# Patient Record
Sex: FEMALE | Race: WHITE | NOT HISPANIC OR LATINO | URBAN - METROPOLITAN AREA
[De-identification: names, ages, dates, MRNs, and addresses within clinical notes are randomized per-mention and may not be internally consistent; named-entity substitution may affect disease eponyms.]

---

## 2022-03-01 ENCOUNTER — TELEPHONE (OUTPATIENT)
Dept: NEUROLOGY | Facility: CLINIC | Age: 52
End: 2022-03-01

## 2022-03-01 NOTE — TELEPHONE ENCOUNTER
Reminder appt call    Patient is scheduled on 3/14/2022 @ 11 am with an arrival time  945 am in the [facility] location with Dr Dasilva  Patient states she will be getting partial of her records on 03/09/2022 she will be seeing Dr Boogie Tomas on 03/09/2022 for her baclofen pump and also 03/11/2022 Tysabri and will bring at appt old MRI CD

## 2022-03-14 ENCOUNTER — TELEPHONE (OUTPATIENT)
Dept: NEUROLOGY | Facility: CLINIC | Age: 52
End: 2022-03-14

## 2022-03-14 ENCOUNTER — OFFICE VISIT (OUTPATIENT)
Dept: NEUROLOGY | Facility: CLINIC | Age: 52
End: 2022-03-14
Payer: MEDICARE

## 2022-03-14 VITALS
TEMPERATURE: 96.4 F | HEART RATE: 61 BPM | SYSTOLIC BLOOD PRESSURE: 158 MMHG | BODY MASS INDEX: 25.9 KG/M2 | HEIGHT: 67 IN | DIASTOLIC BLOOD PRESSURE: 91 MMHG | WEIGHT: 165 LBS

## 2022-03-14 DIAGNOSIS — G35 MS (MULTIPLE SCLEROSIS) (HCC): Primary | ICD-10-CM

## 2022-03-14 DIAGNOSIS — Z97.8 PRESENCE OF INTRATHECAL BACLOFEN PUMP: ICD-10-CM

## 2022-03-14 DIAGNOSIS — N39.0 FREQUENT UTI: ICD-10-CM

## 2022-03-14 DIAGNOSIS — D80.1 HYPOGAMMAGLOBULINEMIA (HCC): ICD-10-CM

## 2022-03-14 DIAGNOSIS — R26.2 AMBULATORY DYSFUNCTION: ICD-10-CM

## 2022-03-14 DIAGNOSIS — G82.50 QUADRIPLEGIA AND QUADRIPARESIS (HCC): ICD-10-CM

## 2022-03-14 DIAGNOSIS — Z97.8: ICD-10-CM

## 2022-03-14 DIAGNOSIS — N31.9 NEUROGENIC BLADDER: ICD-10-CM

## 2022-03-14 DIAGNOSIS — G40.909 SEIZURE DISORDER (HCC): ICD-10-CM

## 2022-03-14 PROCEDURE — 99205 OFFICE O/P NEW HI 60 MIN: CPT | Performed by: PSYCHIATRY & NEUROLOGY

## 2022-03-14 RX ORDER — ATORVASTATIN CALCIUM 80 MG/1
80 TABLET, FILM COATED ORAL DAILY
COMMUNITY
Start: 2022-02-07

## 2022-03-14 RX ORDER — MULTIVIT WITH MINERALS/LUTEIN
1000 TABLET ORAL DAILY
COMMUNITY

## 2022-03-14 RX ORDER — CHOLECALCIFEROL (VITAMIN D3) 1250 MCG
CAPSULE ORAL
COMMUNITY

## 2022-03-14 RX ORDER — ACETAMINOPHEN 500 MG
500 TABLET ORAL EVERY 6 HOURS PRN
COMMUNITY

## 2022-03-14 RX ORDER — LEVOTHYROXINE SODIUM 112 UG/1
125 TABLET ORAL
COMMUNITY

## 2022-03-14 RX ORDER — DIPHENOXYLATE HYDROCHLORIDE AND ATROPINE SULFATE 2.5; .025 MG/1; MG/1
1 TABLET ORAL DAILY
COMMUNITY

## 2022-03-14 RX ORDER — GEMFIBROZIL 600 MG/1
TABLET, FILM COATED ORAL
COMMUNITY
Start: 2022-02-07

## 2022-03-14 RX ORDER — LAMOTRIGINE 25 MG/1
50 TABLET ORAL DAILY
COMMUNITY
Start: 2021-12-29 | End: 2022-08-05 | Stop reason: SDUPTHER

## 2022-03-14 NOTE — PROGRESS NOTES
[facility] MULTIPLE SCLEROSIS CENTER  PATIENT:  Cathy Sinclair  MRN:  [id number]  :  1970  DATE OF SERVICE:  3/14/2022    Assessment/Plan:           Problem List Items Addressed This Visit        Nervous and Auditory    MS (multiple sclerosis) ([facility] ) - Primary    Relevant Orders    MRI brain without contrast    CBC and differential    Comprehensive metabolic panel    STRATIFY JCV(TM) AB W/RFX TO INHIBITION ASSAY    Varicella zoster antibody, IgG    Ambulatory Referral to Hematology / Oncology    IgG, IgA, IgM    Ambulatory Referral to Physiatry    Seizure disorder (HCC)    Relevant Medications    lamoTRIgine (LaMICtal) 25 mg tablet    Quadriplegia and quadriparesis ([facility] )    Relevant Orders    MRI brain without contrast    Ambulatory Referral to Physiatry       Genitourinary    Frequent UTI       Other    Ambulatory dysfunction    Relevant Orders    MRI brain without contrast    Neurogenic bladder    Presence of cerebral intraventricular baclofen pump    Relevant Orders    Ambulatory Referral to Physiatry    Hypogammaglobulinemia [facility])    Relevant Orders    Ambulatory Referral to Hematology / Oncology    IgG, IgA, IgM         Mrs Ludwig Esquivel has presented to [facility] multiple [address] to establish her care  Patient has known multiple sclerosis since   Patient has disability started shortly were after her diagnosis established and since around , patient is nonambulatory  Today patient presented with quadriplegia, patient was in motorized wheelchair, patient required assistance with driving it due to profound weakness in her hands bilaterally  Patient has been taking Tysabri - she had total 49 doses every 4 weeks, with BRIAN virus positive status at 0 63  Patient last MRIs were completed in 2019, significant demyelination has been appreciated with progressive worsening of of brain atrophy has been described    Patient has frequent UTIs, patient is taking Keflex around the time of Tysabri infusion- we extensively discussed plegia is a frequent complication of chronic Tysabri use  Patient required several hospitalizations for pneumonia/UTI/sepsis requiring ICU stay  At this point, we agreed to continue Tysabri infusions Q 6 weeks, blood work will be advised and MRI brain without contrast will be recommend    Patient was also offered to consider S1P receptor modulator Siponimod which was initially tested for secondary progressive multiple sclerosis and showed benefits in decreasing neuro degenerative process in patient with progressive MS  Patient and her  were offered medication information - treatment will be more favorable in the setting of risk of PML and recurrent UTIs  Patient also have hypogammaglobulinemia with decreased IgG and IgM- patient has referral to see hematology team to guide us  Patient has been taking IVIG infusion on monthly basis  We may reconsider this treatment due to high risk of developing blood clots in cardiac insufficiency  Patient has baclofen pump in place- patient will be advised to consider establishing care with physiatry team for evaluation and baclofen pump regimen adjustment  Referral was provided  Patient had single event of the grand mal seizure at the 2013 in the setting of infection  Patient is taking Lamictal  milligrams in the morning and lamotrigine 50 milligrams at bedtime  Serologic workup will be advised  Patient has extensive neuropathic pain involving lower part of her body, patient has been working with pain management with multiple opioid regimens have been tried and not successful  Subjective: fatigue, tremors and weakness all over body  HPI  Mrs Claudia Babin is a 26-year-old female who presented to [address] [place] [address] [address] for evaluation  Patient has established care with Dr Bebe Jean, who is currently retiring     Patient was diagnosed with multiple sclerosis in 1998 in that time patient had developed disability as she is no longer able to ambulate for last 15 years  Patient has baclofen pump established 13 years ago, patient has weakness in upper lower extremities as she is nonambulatory with paraplegia in her lower extremities and paraparesis in her upper extremities  Patient has been taking Tysabri infusions, total 49 doses has been provided  Patient has diminished CT 3 decreased IgG and IgM, elevated BUN, BRIAN virus positive status 0 63, platelet count 999, elevated ALT at 54  Patient imaging completed in 2019    Patient presented with her  as he described that significant progression of her disability to placed in 2011 when she had pneumonia with septic shock requiring placement to induced coma for 5 days and been on respiration for 9 days  Patient had multiple other events requiring hospitalization in ICU, at least 7 additional admission reported  Patient has urine tract infection as ongoing issue she has has been using straight catheterization and following with urology team in [place]  PATIENT BELIEVES SHE HAS PROGRESSIVE WORSENING IN HER CONDITION FOR LAST 5 YEARS  PATIENT REQUIRES ANTIBACTERIAL REGIMEN AROUND THE TIME OF TYSABRI INFUSION  MRI C-spine [id number]: Diffuse demyelination with mild volume loss throughout the cervical cord, similar to before  MRI brain 7/2019-3/2016: Progression of the cerebral atrophy compared to prior study with no substantial interval change in the confluent white matter disease related to multiple sclerosis  2  Bilateral mastoid effusions  MRI T spine 2019: There is no discrete thoracic spinal cord pathology  The following portions of the patient's history were reviewed and updated as appropriate:   She  has no past medical history on file    She   Patient Active Problem List    Diagnosis Date Noted    MS ([facility] sclerosis) ([facility] ) 03/14/2022    Ambulatory dysfunction 03/14/2022    Neurogenic bladder 03/14/2022    Presence of cerebral intraventricular baclofen pump 03/14/2022    Frequent UTI 03/14/2022    Seizure disorder ([facility] ) 03/14/2022    Hypogammaglobulinemia ([facility] ) 03/14/2022    Quadri[facility]gia and [facility]riparesis [facility] 03/14/2022     She  has a past surgical history that includes Gallbladder surgery  Her family history includes Breast cancer in her cousin and maternal aunt  She  reports that she has quit smoking  She has quit using smokeless tobacco  She reports previous alcohol use  She reports previous drug use  Current Outpatient Medications   Medication Sig Dispense Refill    acetaminophen (TYLENOL) 500 mg tablet Take 500 mg by mouth every 6 (six) hours as needed for mild pain 2 tab a day      Ascorbic Acid (vitamin C) 1000 MG tablet Take 1,000 mg by mouth daily      atorvastatin (LIPITOR) 80 mg tablet Take 80 mg by mouth daily      Calcium Carb-Cholecalciferol (OSCAL-D) 500 mg-200 units per tablet Take 1 tablet by mouth 2 (two) times a day with meals      Cholecalciferol (Vitamin D3) 1 25 MG (44167 UT) CAPS Take by mouth      D-MANNOSE PO Take 1,000 mg by mouth 2 (two) times a day      diphenhydrAMINE-APAP, sleep, (TYLENOL PM EXTRA STRENGTH PO) Take by mouth in the morning 2 a day      gemfibrozil (LOPID) 600 mg tablet TAKE 1 TABLET BY MOUTH TWICE DAILY FOR 30 DAYS      Immune Globulin, Human, 30 GM/300ML SOLN Infuse into a venous catheter      lamoTRIgine (LaMICtal) 25 mg tablet Take 50 mg by mouth daily      levothyroxine 112 mcg tablet Take 125 mcg by mouth      multivitamin (THERAGRAN) TABS Take 1 tablet by mouth daily      natalizumab (TYSABRI) 300 mg/15 mL Infuse into a venous catheter      NON FORMULARY Baclofen pump       No current facility-administered medications for this visit       Current Outpatient Medications on File Prior to Visit   Medication Sig    acetaminophen (TYLENOL) 500 mg tablet Take 500 mg by mouth every 6 (six) hours as needed for mild pain 2 tab a day    Ascorbic Acid (vitamin C) 1000 MG tablet Take 1,000 mg by mouth daily    atorvastatin (LIPITOR) 80 mg tablet Take 80 mg by mouth daily    Calcium Carb-Cholecalciferol (OSCAL-D) 500 mg-200 units per tablet Take 1 tablet by mouth 2 (two) times a day with meals    Cholecalciferol (Vitamin D3) 1 25 MG (53023 UT) CAPS Take by mouth    D-MANNOSE PO Take 1,000 mg by mouth 2 (two) times a day    diphenhydrAMINE-APAP, sleep, (TYLENOL PM EXTRA STRENGTH PO) Take by mouth in the morning 2 a day    gemfibrozil (LOPID) 600 mg tablet TAKE 1 TABLET BY MOUTH TWICE DAILY FOR 30 DAYS    Immune Globulin, Human, 30 GM/300ML SOLN Infuse into a venous catheter    lamoTRIgine (LaMICtal) 25 mg tablet Take 50 mg by mouth daily    levothyroxine 112 mcg tablet Take 125 mcg by mouth    multivitamin (THERAGRAN) TABS Take 1 tablet by mouth daily    natalizumab (TYSABRI) 300 mg/15 mL Infuse into a venous catheter    NON FORMULARY Baclofen pump     No current facility-administered medications on file prior to visit  She is allergic to levofloxacin, morphine, ciprofloxacin, levetiracetam, and medical tape            Objective:    Blood pressure 158/91, pulse 61, temperature (!) 96 4 °F (35 8 °C), temperature source Skin, height 5' 7" (1 702 m), weight 74 8 kg (165 lb)  Physical Exam    Neurological Exam  CONSTITUTIONAL: NAD, pleasant  NECK: supple, no lymphadenopathy, no thyromegaly, no JVD  CARDIOVASCULAR: RRR, normal S1S2, no murmurs, no rubs  RESP: clear to auscultation bilaterally, no wheezes/rhonchi/rales  ABDOMEN: soft, non tender, non distended  SKIN: no rash or skin lesions  EXTREMITIES: no edema, pulses 2+bilaterally  PSYCH: appropriate mood and affect  NEUROLOGIC COMPREHENSIVE EXAM: Patient is oriented to person, place and time, NAD; appropriate affect  CN II, III, IV, V, VI, VII,VIII,IX,X,XI-XII intact with EOMI, PERRLA, OKN intact, VF grossly intact, fundi poorly visualized secondary to pupillary constriction; symmetric face noted  Motor: 3/5 RUE shoulder abduction and 2/5  with 2/5 LUE shoulder abduction, 1/5  and inability to extend her fingers; LE 0/5 bilateral symmetric; Sensory: intact to light touch and pinprick bilaterally; diminished vibration sensation feet bilaterally; Coordination not evaluated; DTR: 1/4 through, no Babinski, no clonus  Tandem -wheelchair bound  ROS:  12 points of review of system was reviewed with the patient and was unremarkable with exception: see HPI  Review of Systems   Constitutional: Positive for fatigue  Negative for appetite change and fever  HENT: Negative  Negative for hearing loss, tinnitus, trouble swallowing and voice change  Eyes: Negative  Negative for photophobia and pain  Respiratory: Negative  Negative for shortness of breath  Cardiovascular: Negative  Negative for palpitations  Gastrointestinal: Negative  Negative for nausea and vomiting  Endocrine: Negative  Negative for cold intolerance  Genitourinary: Negative  Negative for dysuria, frequency and urgency  Musculoskeletal: Negative  Negative for myalgias and neck pain  Skin: Negative  Negative for rash  Neurological: Positive for tremors and weakness (all over body)  Negative for dizziness, seizures, syncope, facial asymmetry, speech difficulty, light-headedness, numbness and headaches  Hematological: Negative  Does not bruise/bleed easily  Psychiatric/Behavioral: Negative  Negative for confusion, hallucinations and sleep disturbance

## 2022-03-14 NOTE — TELEPHONE ENCOUNTER
Mail interoffice to [facility] to upload MRI Brain/Cspine/T spine dos 7/05/19 and 7/12/2019

## 2022-03-14 NOTE — TELEPHONE ENCOUNTER
----- Message from Kim Hubbard MD sent at 3/14/2022  2:26 PM EDT -----  Please help findings Physiatry team who can help with Baclofen pump management/refills- patient lives in [facility]      Thank you

## 2022-03-14 NOTE — TELEPHONE ENCOUNTER
Leah Alvarez, please use the below infusion site on the patient's start form when submitting  Please let me know once submitted and I will work on [place]  Thanks!

## 2022-03-14 NOTE — TELEPHONE ENCOUNTER
Closest infusion center that is able to administer Tysabri:    [address] [place] [address]       [address] [place] [address]  [id number]   [phone]    Our practice has used this infusion center in the past  We must complete PA  Orders, PA approval, and clinical notes to be faxed to [phone]  NPI: [id number]

## 2022-03-15 ENCOUNTER — TELEPHONE (OUTPATIENT)
Dept: NEUROLOGY | Facility: CLINIC | Age: 52
End: 2022-03-15

## 2022-03-15 NOTE — TELEPHONE ENCOUNTER
MSW located: [facility]  [address]  [place], [address]  [phone] fax  *MSW spoke with Simi Fig[facility] - they do accept patient's insurances and do fill Baclofen pumps there  [facility]i Figueroa asked that referral (patient's demographics, insurance cards, neuro notes, and script) be faxed to them  MSW phoned number listed for patient in chart ([phone])  Patient's , Shira Narvaez, answered (he is on Communication Consent)  Shira Narvaez stated that patient was previously getting her baclofen pump filled by Dr Saud Heath, Neurologist in [place] (his office number is [phone])  Shira Narvaez stated that that Dr Reva Perez is retiring at the end of March and his office will be closing  Shira Narvaez stated that patient's pump was filled last week, so she will not need a refill for another 3 months  Shira Narvaez stated that they want to find a provider to refill the Baclofen pumps that is closer, as it has been taxing for them to travel to [place] for the past 20 years  MSW advised that [facility] in [place] does accept her insurances and can fill Baclofen pumps  Shira Narvaez was interested in a referral being there  MSW will gather documents and will fax referral to [facility] in [place]

## 2022-03-15 NOTE — TELEPHONE ENCOUNTER
Tysabri Start Form completed, signed and faxed to [phone] along with copy insurance card, medication list and allergy list         Tysabri Start Form scanned into patient chart along with fax confirmation sheet

## 2022-03-15 NOTE — TELEPHONE ENCOUNTER
Tysabri Start Form completed, signed and faxed to [phone] along with copy insurance card, medication list and allergy list       Tysabri Start Form scanned into patient chart along with fax confirmation sheet

## 2022-03-16 NOTE — TELEPHONE ENCOUNTER
Pt has primary coverage with Medicare- no PA is required  Secondary coverage is with [employer]  Per Availity- "Authorization not required  Other insurer is primary  [employer] does not require preauthorization for the service requested based on Product, Group, Benefit, Diagnosis, Place of Service or Prior Auth requirements "    Pt has not yet been switched to our care in Tysabri Touch system  Will work on orders once this is complete

## 2022-03-17 NOTE — TELEPHONE ENCOUNTER
LATE ENTRY from 3/15/22:    MSW faxed patient's demographics, insurance cards, neuro notes, and referral to [facility] at [phone]  Successful fax notice received

## 2022-03-17 NOTE — TELEPHONE ENCOUNTER
MSW followed-up with [facility] this date at [phone] and spoke with [facility]  [facility] confirmed that they did get the faxed referral, but that the  looked at it and feels that patient would be best treated by a [facility] that deal with baclofen pumps  There are 2: Dr Queenie Murguia and Dr Ken Murguia  [facility] provided [employer] number as [phone] and their fax number as [phone]  MSW phoned [facility] at [phone] and spoke with Ute Chamberlain - he confirmed that they do have those 2 providers who treat patients with Baclofen pumps and can refill them  Ute Chamberlain asked for the referral to be faxed to them  MSW will inquire with Dr Chico Go if she is agreeable to referral to [facility] to treat/fill patient's Baclofen pumps

## 2022-03-17 NOTE — TELEPHONE ENCOUNTER
MSW phoned patient/ to make them aware of recommendation for baclofen pump to be managed by the [facility] office instead of the [address] [place] [address]  They were agreeable to referral to [facility]  They requested the address  MSW obtained the address as [address] [place] [address] and provided same to patient/  MSW faxed referral to [facility] at [phone]  Successful fax notice received

## 2022-03-18 NOTE — TELEPHONE ENCOUNTER
Received change of prescriber form for [facility]  Form placed in [place] fax pierce Rice, please assist with this form  Thanks!

## 2022-03-21 NOTE — TELEPHONE ENCOUNTER
MSW phoned [facility] at [phone] and spoke with Michael Chapin  She confirmed receipt of the fax, and stated that same are under reviewed by Dr Jessenia Beltre to see which neurologist patient will be scheduled with  Michael Chapin stated that patient should be scheduled by the end of this week  MSW did advise Michael Chapin that patient's previous neurologist, Dr Martha Gale, is retiring at the end of this month so if they want to request records from his office that they can do so in timely manner  MSW also advised that patient is not due for a baclofen pump refill until about 10 weeks from now

## 2022-03-23 NOTE — TELEPHONE ENCOUNTER
has been switched over in the tysabri touch system  Infusion site [facility]  PA is not required:  Pt has primary coverage with Medicare- no PA is required  Secondary coverage is with [employer]  Per Availity- "Authorization not required  Other insurer is primary  [employer] does not require preauthorization for the service requested based on Product, Group, Benefit, Diagnosis, Place of Service or Prior Auth requirements "    Will need to fax infusion order, demographics, and insurance as well as last office note to infusion center

## 2022-03-23 NOTE — TELEPHONE ENCOUNTER
Infusion order written and emailed to [facility] ALDO ARAUJO for Dr MOHAMUD's signature

## 2022-03-24 NOTE — TELEPHONE ENCOUNTER
Attempted to fax infusion order/ referral without success  Called [facility] at [phone], I was provided alternative fax #  [phone]  Faxed  Pt and her  made aware of infusion site  Will f/u

## 2022-03-24 NOTE — TELEPHONE ENCOUNTER
MSW attempted to reach [facility] this date x 2 at [phone]  No answer and no recording  MSW will try to reach them again on 3/25

## 2022-03-25 ENCOUNTER — TELEPHONE (OUTPATIENT)
Dept: NEUROLOGY | Facility: CLINIC | Age: 52
End: 2022-03-25

## 2022-03-25 NOTE — TELEPHONE ENCOUNTER
Received two CD from [facility]  One CD was uploaded successfully xray abdomen ap lateral and the CD MRI Brain/Cspine/Tspine dos 07/5/2019 was not able uploaded it wouldn't load mail out the two cd's to pts address we have one file

## 2022-03-28 NOTE — TELEPHONE ENCOUNTER
MSW followed-up with [facility] this date at [phone] and spoke with Sarahi - she advised that patient's records are still under review with Dr Ubaldo Ren so patient cannot be scheduled yet  Sarahi stated that the  told her on 3/25 that they will reach out to the patient once the records have been reviewed  Sarahi suggested that this writer give them a few more days to get patient scheduled, so this writer will call back on 3/30  Sarahi stated that she will send another message about patient's old neurologist's office closing in case they need to obtain the records

## 2022-03-30 NOTE — TELEPHONE ENCOUNTER
MSW phoned [facility] at [phone] and spoke with Jesse Simms stated that patient's records are still under review by Dr Eleanor Arenas  MSW again explained that patient's old neurologist who handled her Baclofen pump is retiring at the end of this month in case they wanted to request patient's old records  MSW explained to Jesse Simms that patient should not be due for a refill of her Baclofen pump for about 9 weeks  Jesse Simms stated that he will send ANOTHER message to the team in an effort to get patient scheduled  MSW phoned patient/ to make them aware that patients records have been faxed to [facility], but that they are still being reviewed  MSW advised that this writer has been following-up with [facility] consistently to see if patient has been scheduled and to see if they were able to receive records from patient's previous neurologist/baclofen pump provider  MSW inquired with patient/ if they had any of patient's Baclofen pump records  Patient's  stated that they only thing he has is the print out from when the Baclofen pump was last filled  Patient's  stated that Dr Vito Pitt office records will be available from a medical records company as of 5/1, but patient's  did not have the name/phone number of this medical records company  MSW offered to contact Dr Vincenzo leyva to request same  Patient's  unsure when the office will officially close aside from the "end of this month"  MSW placed call to Dr Vincenzo leyva at [phone]  No answer  MSW left a message in the general voicemail requesting a callback  Awaiting same

## 2022-03-31 NOTE — TELEPHONE ENCOUNTER
MSW received a callback from [facility] at Dr Ashleigh Ann office  Marissa stated that patient/ have a print out of the last Baclofen pump refill  Marissa stated that patient is at the max dosage of 4000 units Q 3 months  Marissa stated that she will fax this writer a copy of the print out  Marissa stated that patient's medical records will be available through [facility] at [phone]  Marissa stated that she is under the impression that patient will need to request the records herself

## 2022-03-31 NOTE — TELEPHONE ENCOUNTER
Called [facility] at [phone]  They confirm infusion referral was received   Pt is scheduled for Tysabri infusion on 4/25/22 @ 10:30am

## 2022-04-04 ENCOUNTER — TELEPHONE (OUTPATIENT)
Dept: NEUROLOGY | Facility: CLINIC | Age: 52
End: 2022-04-04

## 2022-04-04 NOTE — TELEPHONE ENCOUNTER
Patient/patient's  called requesting medical records for Hematology appt  Advised patient/ they will need to complete [facility] form which will then need to be submitted to [address] [place] [address]  Patient requested we send [facility] form via email  [facility] sent to email address on file  Patient/ requested Hematology referral be faxed to:  [phone]    Referral faxed to number above

## 2022-04-05 NOTE — TELEPHONE ENCOUNTER
MSW phoned [employer] again this date at [phone] and spoke with [facility] Marshal ELI  She staed that patient's referral is STILL under review by Dr Julio Nunez  [facility] Marshal EIL noted that they received the referral on 3/18 and is also puzzled why patient has not yet been scheduled  [facility] Marshal ELI stated that she will send a message to the RN to see if they need anything else and will get back to this writer  MSW will await callback

## 2022-04-07 ENCOUNTER — TELEPHONE (OUTPATIENT)
Dept: NEUROLOGY | Facility: CLINIC | Age: 52
End: 2022-04-07

## 2022-04-07 NOTE — TELEPHONE ENCOUNTER
MAGGIE received via fax   Scanned into media and sent to [facility] \[facility] [place] [facility]\""

## 2022-04-07 NOTE — TELEPHONE ENCOUNTER
MSW phoned [facility] at [phone] and [facility]  She stated that patient's referral is still under review  MSW requested to speak with the Practice Administrator or person in charge of scheduling as the referral was faxed on 3/17/22 and there has been no progress on the referral  BARB [place] NOREEN MEENAKSHI reached out to the MA who does the scheduling but she was unavailable  [facility] took a message and stated that she will have someone call this writer back  MSW will await callback

## 2022-04-11 NOTE — TELEPHONE ENCOUNTER
FABIOLA phoned [facility] at [phone] to inquire if patient was scheduled yet  MSW spoke with [facility] who stated that patient has not yet been scheduled  Melody put this writer on hold and spoke with her supervisor in the [facility] stated that referrals for existing baclofen pumps take longer, as more records need to be reviewed which may be why patient has not been scheduled yet  MSW advised that the referral was faxed on 3/17  MSW advised that this writer is concerned with the delay in case the patient is declined, and if so this writer will need to locate another provider to take over her Baclofen pump management  [facility] will document this writer's call and will ask a supervisor to get back to this writer from the Neurology office

## 2022-04-15 NOTE — TELEPHONE ENCOUNTER
MSW received a callback from Liudmila Mcpherson, [address] at [facility] this date  Liudmila Mcpherson stated that patient's referral was reviewed by 2 neurologists and a neurosurgeon but it was determined that they will not see her as they do not typically see patients whose Baclofen pumps were implanted by another practice  MSW advised that it is upsetting that it has taken them 1 month to make this determination when this information was given at the time of referral and since this writer has called back numerous times to check the status of referral  Liudmila Mcpherson apologized for the delay and did admit that this should have been handled better on their end  Liudmila Mcpherson provided this writer with other alternative health networks to check with to see if they will see patient:    [address]   [phone]  *spoke with Mo Pat - they do handle baclofen pumps and will accept transfers of care for same  Mo Pat stated that referral will need to be faxed to [phone]  Mery Porter (partnership with [employer])  [phone]  *spoke with Hemma - they do not service Baclofen pumps within the [address] [place] [address] Neurology [facility]  [phone]  *spoke Jono Reinoso - they do handle baclofen pumps and are aware that patient has an existing baclofen pump  Would want referral to be faxed to [phone]  MSW phoned patient's  to make him aware of above  He was upset and stated that he would be calling [facility] directly  MSW advised him to speak with Zora Renee Supervisor, by calling [phone]  MSW offered choice of other office that can manage Baclofen pumps  He selected [facility]  MSW faxed demographics, insurance card copies, neuro note, baclofen pump print out, and script to [phone]  Successful fax notice received

## 2022-04-18 NOTE — TELEPHONE ENCOUNTER
MSW phoned [facility] Elizabeth Puri at [phone] and spoke with Sil Stevens to check status of referral  Sil Stevens stated that she does not see patient in their system yet, but that it may be because the referral was faxed over late in the day on 4/15  Sil Stevens stated that they try to respond to referrals in 24-48 business hours  Sil Stevens took patient's demographic information to start a profile where the fax can be linked to  Sil Stevens also took down this writer's name/number so that their office can let this writer know if they are able to accept the referral  Sil Stevens stated that if this writer does not get a call back by later in the day on 4/19 to call back to check the status

## 2022-04-18 NOTE — TELEPHONE ENCOUNTER
FABIOLA retrieved a message left by King Pena at [facility] [place] [address]  She stated that they do not manage patients with Baclofen pumps at their [place] [address] location, but she thinks the [place] office might  King Pena advised that she forwarded patient's records to [place] and provided their contact # to follow-up on the referral as [phone]  This is a 60 year old Female w/ pmh of HTN, DVT on Xarelto, peritonitis s/p Oral's procedure and ileostomy (reversed 2011), AARON who presented to [facility] on 11/18  w/ fevers found to have COVID-19, intubated 11/23, course complicated by  superimposed PNA and bacteremia with Stenotrophomonas (12/11, completed Levaquin x7 days), MSSA/P. mirablis bacteremia (on Cefazolin, potentially due to urine vs line-associated and MSSA in sputum due to PNA), also with lung abscesses on CT chest on 12/5 , ARTEMIO/ATN requiring CRRT and HD. Now unable to wean from ventilator and transferred from [facility] on 12/10 to offload COVID unit       Neuro  Alert & oriented x3 @baseline. Patient hwith + gag, spontaneous blinking but not moving extremities. No response to painful stimuli. Last CTH on 12/5 was negative  - c/w precedex gtt for comfort while on ventilator      Resp  Acute hypoxemic respiratory failure 2/2 covid-19 PNA. Intubated on 11/23, now unable to wean from ventilator   - c/w AC 30/350/8/40%   - Pressure support trails daily, but may need tracheostomy if unable to wean     CV  Hx of HTN, DVT on xarelto, not in shock state   - Continue Midodrine at 30 TID, off pressors  - Continue heparin gtt, currently therapeutic   - CT negative for PE    GI  - Continue TF, tolerating well  - Continue bowel regimen, last BM 12/7      ARTEMIO 2/2 ATN requiring CRRT, then bumex challenge and now HD  -last HD 12/8   -renal following   -will trend electrolytes   - negro in place  -strict I’s and O’s    ID  Covid 19 pneumonia, MSSA bacteremia/pneumonia, now cavitary consolidation on CT chest.  - s/p remdesivir and dexamethasone   - + Sputum/blood MSSA, + P mirablis in blood, +Stenotrophomonas in sputum   - Continue Cefazolin for MSSA bacteremia.   - WBC downtrended now 10  - s/p Levofloxacin   - caspofungin discontinued, no fungal growth.   - Blood cx from 12/7 NGTD     Lines   Right IJ nia 11/30    This is a 60 year old Female w/ pmh of HTN, DVT on Xarelto, peritonitis s/p Oral's procedure and ileostomy (reversed 2011), AARON who presented to [facility] on 11/18  w/ fevers found to have COVID-19, intubated 11/23, course complicated by  superimposed PNA and bacteremia with Stenotrophomonas (12/11, completed Levaquin x7 days), MSSA/P. mirablis bacteremia (on Cefazolin, potentially due to urine vs line-associated and MSSA in sputum due to PNA), also with lung abscesses on CT chest on 12/5 , ARTEMIO/ATN requiring CRRT and HD. Now unable to wean from ventilator and transferred from [facility] on 12/10 to offload COVID unit       Neuro  Alert & oriented x3 @baseline. Patient hwith + gag, spontaneous blinking but not moving extremities.   - c/w precedex gtt for comfort while on ventilator    -most likely encephalopathic from renal failure and  uremia, if mental status doesn't improve after HD sessions may need CTH     Resp  Acute hypoxemic respiratory failure 2/2 covid-19 PNA. Intubated on 11/23, now unable to wean from ventilator   - c/w AC 30/350/8/40%   - Pressure support trails daily, but may need tracheostomy if unable to wean     CV  Hx of HTN, DVT on xarelto, not in shock state   - Continue Midodrine at 30 TID, off pressors  - Continue heparin gtt, currently therapeutic   - CT negative for PE    GI  - Continue TF, tolerating well  - Continue bowel regimen, last BM 12/7      ARTEMIO 2/2 ATN requiring CRRT, then bumex challenge and now HD  -last HD 12/8   -renal following   -will trend electrolytes   - negro in place  -strict I’s and O’s    ID  Covid 19 pneumonia, MSSA bacteremia/pneumonia, now cavitary consolidation on CT chest.  - s/p remdesivir and dexamethasone   - + Sputum/blood MSSA, + P mirablis in blood, +Stenotrophomonas in sputum   - Continue Cefazolin for MSSA bacteremia.   - WBC downtrended now 10  - s/p Levofloxacin   - caspofungin discontinued, no fungal growth.   - Blood cx from 12/7 NGTD     Lines   Right IJ nia 11/30  arrow IV's

## 2022-04-19 NOTE — TELEPHONE ENCOUNTER
MSW placed call to [address] [place] [address] to see if they can would accept a patient that has an existing Baclofen pump for management  No answer at [phone]  MSW left message requesting callback  Awaiting same

## 2022-04-19 NOTE — TELEPHONE ENCOUNTER
MSW received a call back from Greg Medrano at [facility] stating that they can take over fills for an existing Baclofen pump  Greg Medrano provided their fax # as [phone]  MSW then phoned patient/ to make them aware of options for practices that can take over Baclofen pump refills  Patient's  stated they have already received a call from [place] and they are interested in proceeding with them  Patient stated that they already have an appt for a Baclofen pump refill for 6/24  Patient's  stated that the alarm for the pump's low reservoir is set to go off on 7/4, so the 6/24 appt will work  MSW phoned [facility] [address] [place] [address] at [phone] and spoke with Nani Nichols  He confirmed that patient is scheduled for a Baclofen pump refill appt on 6/24 at 240PM with Dr Jennifer Ibarra confirmed that they have all they need from this office at this time  MSW will alert Dr Jimena Pastor to above  MSW will be available to patient as needed

## 2022-04-21 ENCOUNTER — TELEPHONE (OUTPATIENT)
Dept: NEUROLOGY | Facility: CLINIC | Age: 52
End: 2022-04-21

## 2022-04-21 NOTE — TELEPHONE ENCOUNTER
[facility]  Pt is scheduled for Tysabri on 4/25/22  Asking for notes from pt's last Tysabri infusion on 3/9/22  This should be faxed to [phone]    Located records on 3/9/22 Tysabri infusion and faxed

## 2022-04-22 ENCOUNTER — TELEPHONE (OUTPATIENT)
Dept: NEUROLOGY | Facility: CLINIC | Age: 52
End: 2022-04-22

## 2022-04-22 NOTE — TELEPHONE ENCOUNTER
Patient's  Vicki Kimball called requesting script for urinary catheters   confirmed patient sees a urologist  Advised  patient should call urologist for urinary catheters   states patient's old neurologist previously wrote scripts for catheters  Dr John Angel - do you agree patient should contact urologist for script for urinary catheters?

## 2022-06-06 ENCOUNTER — DOCUMENTATION (OUTPATIENT)
Dept: NEUROLOGY | Facility: CLINIC | Age: 52
End: 2022-06-06

## 2022-06-06 NOTE — PROGRESS NOTES
Tysabri touch authorization questionnaire submitted, valid until 1/5/23 at [facility]

## 2022-07-18 ENCOUNTER — TELEPHONE (OUTPATIENT)
Dept: NEUROLOGY | Facility: CLINIC | Age: 52
End: 2022-07-18

## 2022-07-18 NOTE — TELEPHONE ENCOUNTER
Phone call to patient  1  Reminder appt call     Patient is scheduled on 07/25/2022 @ 2 pm with an arrival time  145 pm in the [facility] location with Dr Valeriano Peoples  Patient confirmed appt  2   Request MRI report and CD-Patient  states needed to be added on the order when had imaging  Advised will call [facility] to retrieve MRI CD and Report

## 2022-07-25 ENCOUNTER — TELEPHONE (OUTPATIENT)
Dept: NEUROLOGY | Facility: CLINIC | Age: 52
End: 2022-07-25

## 2022-07-25 ENCOUNTER — OFFICE VISIT (OUTPATIENT)
Dept: NEUROLOGY | Facility: CLINIC | Age: 52
End: 2022-07-25
Payer: MEDICARE

## 2022-07-25 VITALS
HEIGHT: 67 IN | SYSTOLIC BLOOD PRESSURE: 157 MMHG | TEMPERATURE: 96.3 F | BODY MASS INDEX: 25.84 KG/M2 | DIASTOLIC BLOOD PRESSURE: 74 MMHG | HEART RATE: 56 BPM

## 2022-07-25 DIAGNOSIS — M54.42 CHRONIC MIDLINE LOW BACK PAIN WITH LEFT-SIDED SCIATICA: ICD-10-CM

## 2022-07-25 DIAGNOSIS — R26.2 AMBULATORY DYSFUNCTION: ICD-10-CM

## 2022-07-25 DIAGNOSIS — Z97.8: ICD-10-CM

## 2022-07-25 DIAGNOSIS — G82.50 QUADRIPLEGIA AND QUADRIPARESIS (HCC): ICD-10-CM

## 2022-07-25 DIAGNOSIS — G89.29 CHRONIC MIDLINE LOW BACK PAIN WITH LEFT-SIDED SCIATICA: ICD-10-CM

## 2022-07-25 DIAGNOSIS — Z97.8 PRESENCE OF INTRATHECAL BACLOFEN PUMP: ICD-10-CM

## 2022-07-25 DIAGNOSIS — G35 MS (MULTIPLE SCLEROSIS) (HCC): Primary | ICD-10-CM

## 2022-07-25 PROCEDURE — 99215 OFFICE O/P EST HI 40 MIN: CPT | Performed by: PSYCHIATRY & NEUROLOGY

## 2022-07-25 RX ORDER — NALTREXONE HYDROCHLORIDE 50 MG/1
TABLET, FILM COATED ORAL
Qty: 45 TABLET | Refills: 3 | Status: SHIPPED | OUTPATIENT
Start: 2022-07-25 | End: 2022-09-06

## 2022-07-25 NOTE — TELEPHONE ENCOUNTER
1  Patient will be resuming monthly IVIg infusions at the dose of 400 mg q3-4 weeks, considering patient has been taking Tysabri infusion q 6 weeks  Patient has established Home infusion services for her IVIgs : Valerie Laughlin from [address] ; phone [phone]  Serologic work up will be done prior to IVIg infusion:  CBC/CMP/fibrinogen/UA/immunoglobulins    2   Please help finding compounding pharmacy in the patient area

## 2022-07-25 NOTE — PROGRESS NOTES
[facility]  PATIENT:  Maria Isabel Zapata  MRN:  [id number]  :  1970  DATE OF SERVICE:  2022    Assessment/Plan:           Problem List Items Addressed This Visit        Nervous and Auditory    MS (multiple sclerosis) ([facility] ) - Primary    Relevant Medications    naltrexone (REVIA) 50 mg tablet    Quadriplegia and quadriparesis (HCC)    Relevant Medications    naltrexone (REVIA) 50 mg tablet       Other    Ambulatory dysfunction    Relevant Medications    naltrexone (REVIA) 50 mg tablet    Presence of cerebral intraventricular baclofen pump      Other Visit Diagnoses     Presence of intrathecal baclofen pump        Chronic midline low back pain with left-sided sciatica        Relevant Medications    naltrexone (REVIA) 50 mg tablet         Mrs Daniel Bains has presented to [place] [address] [facility] for follow-up on multiple sclerosis related issues  Patient described no new focal neurological dysfunction considering patient is quadriplegic with left worse than right side paralysis noted  Patient presented with her  on wheelchair  We personally reviewed patient brain imaging, we agreed patient has no signs of disease progression with no signs of PML or other infectious process in brain parenchyma, mild brain volume loss appreciated likely related to secondary progressive MS neuro degenerative outcomes  Patient agreed she would like to continue Tysabri 300 mg Q 6 weeks and she would like to come back on monthly IVIG 400 mg/kg/day, patient has her infusion team at [facility], with Anette Mendez RN provide services in the patient house ( [phone], [facility])  Infusions will be adjusted  q 4 weeks, or 2 weeks prior to Tysabri infusions  We also discussed patient has chronic lower back pain with neuropathic pain resistant to ever single treatment patient had previously tried, with intermittent muscle spasm which make pain worse despite baclofen time use  We discussed potential trial of low-dose naltrexone 1 5mg as patient to consider starting regimen month tablets a day for 2 weeks then increase to 2 a day as maintenance, may adjust dose to 4 5 mg a day if well tolerated and no thyroid dysfunction noted  Patient is to continue physical therapy with occupational therapy,    MRI of the brain with no contrast will be advised within 6 months  Patient is to follow with [address] [place] [address] Neurology within 4-6 weeks as a virtual visit  Subjective: weakness, numbness and pain lower back down left leg and spasms  Pt here with   HPI  Mrs Joyce Mares has presented to [address] [place] [address] [address] for follow-up on multiple sclerosis related issues  Patient has established care with Dr Nicole Bowers  Since last office visit, patient described no new focal neurological dysfunction, persistent weakness in upper lower extremity has been described, lower back pain with pain radiating along left lower extremity has been still noted  We extensively discussed multiple regimens patient had tried for her chronic pain, no significant benefits has been reported  Patient believes she has slightly worsening of her symptoms, no recent infection or hospitalization reported  Patient has known multiple sclerosis since 1998  Patient has disability started shortly were after her diagnosis established and since around 2007, patient is nonambulatory  Today patient presented with quadriplegia, patient was in motorized wheelchair, patient required assistance with driving it due to profound weakness in her hands bilaterally  Patient has been taking Tysabri - she had total 49 doses every 4 weeks, with BRIAN virus positive status at 0 63  Patient last MRIs were completed in 2019, significant demyelination has been appreciated with progressive worsening of of brain atrophy has been described    Patient has frequent UTIs, patient is taking Keflex around the time of Tysabri infusion- we extensively discussed UTI is a frequent complication of chronic Tysabri use  Patient required several hospitalizations for pneumonia/UTI/sepsis requiring ICU stay  At this point, we agreed to continue Tysabri infusions Q 6 weeks, blood work will be advised and MRI brain without contrast will be recommend      Patient was also offered to consider S1P receptor modulator Siponimod which was initially tested for secondary progressive multiple sclerosis and showed benefits in decreasing neuro degenerative process in patient with progressive MS  Patient and her  were offered medication information - treatment will be more favorable in the setting of risk of PML and recurrent UTIs  Patient also have hypogammaglobulinemia with decreased IgG and IgM- patient has referral to see hematology team to guide us  Patient has been taking IVIG infusion on monthly basis  We may reconsider this treatment due to high risk of developing blood clots in cardiac insufficiency  Patient has baclofen pump in place- patient will be advised to consider establishing care with physiatry team for evaluation and baclofen pump regimen adjustment  Referral was provided       Patient had single event of the grand mal seizure at the 2013 in the setting of infection  Patient is taking Lamictal  milligrams in the morning and lamotrigine 50 milligrams at bedtime  Serologic workup will be advised  The following portions of the patient's history were reviewed and updated as appropriate:   She  has no past medical history on file    She   Patient Active Problem List    Diagnosis Date Noted    MS (multiple sclerosis) ([facility] ) 03/14/2022    Ambulatory dysfunction 03/14/2022    Neurogenic bladder 03/14/2022    Presence of cerebral intraventricular baclofen pump 03/14/2022    Frequent UTI 03/14/2022    Seizure disorder ([facility] ) 03/14/2022    [facility]ag[facility] 03/14/2022    Quadriplegia and quadriparesis ([facility] ) 03/14/2022     She  has a past surgical history that includes Gallbladder surgery  Her family history includes Breast cancer in her cousin and maternal aunt  She  reports that she has quit smoking  She has quit using smokeless tobacco  She reports previous alcohol use  She reports previous drug use  Current Outpatient Medications   Medication Sig Dispense Refill    acetaminophen (TYLENOL) 500 mg tablet Take 500 mg by mouth every 6 (six) hours as needed for mild pain 2 tab a day      Ascorbic Acid (vitamin C) 1000 MG tablet Take 1,000 mg by mouth daily      atorvastatin (LIPITOR) 80 mg tablet Take 80 mg by mouth daily      Calcium Carb-Cholecalciferol (OSCAL-D) 500 mg-200 units per tablet Take 1 tablet by mouth 2 (two) times a day with meals      Cholecalciferol (Vitamin D3) 1 25 MG (06617 UT) CAPS Take by mouth      D-MANNOSE PO Take 1,000 mg by mouth 2 (two) times a day      diphenhydrAMINE-APAP, sleep, (TYLENOL PM EXTRA STRENGTH PO) Take by mouth in the morning 2 a day      gemfibrozil (LOPID) 600 mg tablet TAKE 1 TABLET BY MOUTH TWICE DAILY FOR 30 DAYS      lamoTRIgine (LaMICtal) 25 mg tablet Take 50 mg by mouth daily      levothyroxine 112 mcg tablet Take 125 mcg by mouth      multivitamin (THERAGRAN) TABS Take 1 tablet by mouth daily      naltrexone (REVIA) 50 mg tablet Take 1 5 mg for 2 weeks, then 3 mg tab PO daily 45 tablet 3    natalizumab (TYSABRI) 300 mg/15 mL Infuse into a venous catheter      NON FORMULARY Baclofen pump      Immune Globulin, Human, 30 GM/300ML SOLN Infuse into a venous catheter       No current facility-administered medications for this visit       Current Outpatient Medications on File Prior to Visit   Medication Sig    acetaminophen (TYLENOL) 500 mg tablet Take 500 mg by mouth every 6 (six) hours as needed for mild pain 2 tab a day    Ascorbic Acid (vitamin C) 1000 MG tablet Take 1,000 mg by mouth daily    atorvastatin (LIPITOR) 80 mg tablet Take 80 mg by mouth daily    Calcium Carb-Cholecalciferol (OSCAL-D) 500 mg-200 units per tablet Take 1 tablet by mouth 2 (two) times a day with meals    Cholecalciferol (Vitamin D3) 1 25 MG (80313 UT) CAPS Take by mouth    D-MANNOSE PO Take 1,000 mg by mouth 2 (two) times a day    diphenhydrAMINE-APAP, sleep, (TYLENOL PM EXTRA STRENGTH PO) Take by mouth in the morning 2 a day    gemfibrozil (LOPID) 600 mg tablet TAKE 1 TABLET BY MOUTH TWICE DAILY FOR 30 DAYS    lamoTRIgine (LaMICtal) 25 mg tablet Take 50 mg by mouth daily    levothyroxine 112 mcg tablet Take 125 mcg by mouth    multivitamin (THERAGRAN) TABS Take 1 tablet by mouth daily    natalizumab (TYSABRI) 300 mg/15 mL Infuse into a venous catheter    NON FORMULARY Baclofen pump    Immune Globulin, Human, 30 GM/300ML SOLN Infuse into a venous catheter     No current facility-administered medications on file prior to visit  She is allergic to levofloxacin, morphine, ciprofloxacin, levetiracetam, and medical tape            Objective:    Blood pressure 157/74, pulse 56, temperature (!) 96 3 °F (35 7 °C), temperature source Skin, height 5' 7" (1 702 m)  Physical Exam    Neurological Exam  Neurological Exam  CONSTITUTIONAL: NAD, pleasant  NECK: supple, no lymphadenopathy, no thyromegaly, no JVD  CARDIOVASCULAR: RRR, normal S1S2, no murmurs, no rubs  RESP: clear to auscultation bilaterally, no wheezes/rhonchi/rales  ABDOMEN: soft, non tender, non distended  SKIN: no rash or skin lesions  EXTREMITIES: no edema, pulses 2+bilaterally  PSYCH: appropriate mood and affect  NEUROLOGIC COMPREHENSIVE EXAM: Patient is oriented to person, place and time, NAD; appropriate affect  CN II, III, IV, V, VI, VII,VIII,IX,X,XI-XII intact with EOMI, PERRLA, OKN intact, VF grossly intact, fundi poorly visualized secondary to pupillary constriction; symmetric face noted   Motor: 3/5 RUE shoulder abduction and 2/5  with 2/5 LUE shoulder abduction, 1/5  and inability to extend her fingers; LE 0/5 bilateral symmetric; Sensory: intact to light touch and pinprick bilaterally; diminished vibration sensation feet bilaterally; Coordination not evaluated; DTR: 1/4 through, no Babinski, no clonus  Tandem -wheelchair bound  ROS:  12 points of review of system was reviewed with the patient and was unremarkable with exception: see HPI  Review of Systems   Constitutional: Negative  Negative for appetite change and fever  HENT: Negative  Negative for hearing loss, tinnitus, trouble swallowing and voice change  Eyes: Negative  Negative for photophobia and pain  Respiratory: Negative  Negative for shortness of breath  Cardiovascular: Negative  Negative for palpitations  Gastrointestinal: Negative  Negative for nausea and vomiting  Endocrine: Negative  Negative for cold intolerance  Genitourinary: Negative  Negative for dysuria, frequency and urgency  Musculoskeletal: Negative  Negative for myalgias and neck pain  Skin: Negative  Negative for rash  Neurological: Positive for weakness and numbness  Negative for dizziness, tremors, seizures, syncope, facial asymmetry, speech difficulty, light-headedness and headaches  Pain lower back down the left leg and spams   Hematological: Negative  Does not bruise/bleed easily  Psychiatric/Behavioral: Negative  Negative for confusion, hallucinations and sleep disturbance

## 2022-08-05 DIAGNOSIS — G40.909 SEIZURE DISORDER (HCC): Primary | ICD-10-CM

## 2022-08-05 RX ORDER — LAMOTRIGINE 25 MG/1
50 TABLET ORAL DAILY
Qty: 180 TABLET | Refills: 0 | Status: SHIPPED | OUTPATIENT
Start: 2022-08-05 | End: 2022-10-18 | Stop reason: SDUPTHER

## 2022-08-05 NOTE — TELEPHONE ENCOUNTER
Pt's  [address] left voicemail requesting refill of lamotrigine 25mg tabs  Pt takes 2 tabs daily  Asking for 90 day supply  Uses [facility] pharmacy in [place]  Per last OV note, "Patient had single event of the grand mal seizure at [facility] in the setting of infection   Patient is taking Lamictal  milligrams in the morning and lamotrigine 50 milligrams at bedtime "    Please review and sign if agreeable

## 2022-08-05 NOTE — TELEPHONE ENCOUNTER
It doesn't appear we have prescribed this in the past  I will fill a 90 day supply until Dr David Hammond is back in office and can determine if she is taking over this prescription

## 2022-08-09 ENCOUNTER — TELEPHONE (OUTPATIENT)
Dept: NEUROLOGY | Facility: CLINIC | Age: 52
End: 2022-08-09

## 2022-08-09 NOTE — TELEPHONE ENCOUNTER
Per office note, "Patient agreed she would like to continue Tysabri 300 mg Q 6 weeks and she would like to come back on monthly IVIG 400 mg/kg/day, patient has her infusion team at [facility], with Omdi Hernandes RN provide services in the patient house ( [phone], DCR [id number])  Infusions will be adjusted  q 4 weeks, or 2 weeks prior to Tysabri infusions "      1  Please enter lab orders so pt can get needed testing to start IVIG  Once results have been received, will send paper order to [facility]  2  I located [facility], this is located near where patient lives

## 2022-08-09 NOTE — TELEPHONE ENCOUNTER
Message  Received: 2 weeks ago  MD Anthony Giles, KATHERIN  Patient has elevated BUN - with concern for potential dehydration, patient is to follow with PCP for abnormal serologic dysfunction ( elevated Ca, Na)

## 2022-08-09 NOTE — TELEPHONE ENCOUNTER
Spoke w/patient's  Elizabeth Da Silva  Advised him of lab results and recommendations   verbalized understanding  Lab results faxed to PCP

## 2022-08-10 NOTE — TELEPHONE ENCOUNTER
I do not see order placed for fibrinogen and UA  Are these still needed? Orders pended in this encounter  Please sign if they are needed

## 2022-08-11 NOTE — TELEPHONE ENCOUNTER
Pt and her  made aware of needed labs prior to initiating IVIG infusions  Pt will go to a lab with [facility]  They would like lab scripts emailed to [email or web address]  Emailed via fax machine

## 2022-08-12 NOTE — TELEPHONE ENCOUNTER
Dr Santos, please advise if you will be taking over lamotrigine Rx (see below notes) or if pt will need to contact another provider for future fills

## 2022-08-15 NOTE — TELEPHONE ENCOUNTER
Antoine Swartz MD  You 17 hours ago (3:13 PM)         Yes, patient will be receiving lamotrigine from me

## 2022-08-25 NOTE — TELEPHONE ENCOUNTER
Spoke with pt and her   She had labs completed at [facility] last week  No results received  [facility] laboratory client services at [phone] and spoke with Fifi Barroso  Pt had labs completed on 8/18/22  She will fax results to [phone]  Awaiting results

## 2022-08-31 NOTE — TELEPHONE ENCOUNTER
Spoke with pt and her   They report they did discuss test results already with pt's PCP Dr Ed Kim  He had no further concerns about results, no additional tests ordered  I did encourage pt to continue follow up with PCP, she was agreeable  Dr Santos, okay to move forward with IVIG infusions?

## 2022-08-31 NOTE — TELEPHONE ENCOUNTER
Patient has elevated sodium in serum and protein in urine- follow up with primary team   Fibrinogen and immunoglobulin was done and within normal range; Once clared by primary team, please proceed with IVIg

## 2022-09-01 NOTE — TELEPHONE ENCOUNTER
Received call from [address] [place] [address] office [phone]  Patient's  contacted office d/t patient's abnormal UA  She is requesting lab results as well as OV notes  Please fax to:    (f) [phone]    7/25/22 LOV notes and UA labs printed and faxed to number above

## 2022-09-01 NOTE — TELEPHONE ENCOUNTER
Working on paper order  IVIG 400mg/kg/day = 27 5mg daily    Please advise over how many hours should this be infused over? Any additional pre-medication or hydration orders (tylenol and benadryl currently ordered)? Thanks!

## 2022-09-02 NOTE — TELEPHONE ENCOUNTER
Elly Reyes MD  You 6 minutes ago (10:27 AM)         Hold off IVIg untile patient is cleared by PCP office

## 2022-09-02 NOTE — TELEPHONE ENCOUNTER
Called Dr Hargrove's office at [phone]  Staff did not receive lab results and last office note  Will fax again  I did ask if Dr Diego Pichardo can provide clearance for IVIG infusions  Kena Jeong states they have never been asked something like this before  Mikel Bates will need to speak with Dr Back regarding her concerns with pt and IVIG  Can call to speak with Mikel Bates at [phone] or email him at [email or web address]  Lab results and last office note faxed  Please reach out to Dr Diego Pichardo to discuss clearance

## 2022-09-06 ENCOUNTER — TELEPHONE (OUTPATIENT)
Dept: NEUROLOGY | Facility: CLINIC | Age: 52
End: 2022-09-06

## 2022-09-06 ENCOUNTER — TELEMEDICINE (OUTPATIENT)
Dept: NEUROLOGY | Facility: CLINIC | Age: 52
End: 2022-09-06
Payer: MEDICARE

## 2022-09-06 DIAGNOSIS — G82.50 QUADRIPLEGIA AND QUADRIPARESIS (HCC): ICD-10-CM

## 2022-09-06 DIAGNOSIS — G40.909 SEIZURE DISORDER (HCC): ICD-10-CM

## 2022-09-06 DIAGNOSIS — R26.2 AMBULATORY DYSFUNCTION: ICD-10-CM

## 2022-09-06 DIAGNOSIS — N31.9 NEUROGENIC BLADDER: ICD-10-CM

## 2022-09-06 DIAGNOSIS — Z97.8: ICD-10-CM

## 2022-09-06 DIAGNOSIS — G35 MS (MULTIPLE SCLEROSIS) (HCC): Primary | ICD-10-CM

## 2022-09-06 PROCEDURE — 99214 OFFICE O/P EST MOD 30 MIN: CPT | Performed by: PSYCHIATRY & NEUROLOGY

## 2022-09-06 NOTE — PROGRESS NOTES
Virtual Regular Visit    Verification of patient location:    Patient is located in the following state in which I hold an active license [place]      Assessment/Plan:    Problem List Items Addressed This Visit        Nervous and Auditory    MS (multiple sclerosis) ([facility] [address] ) - Primary    Seizure disorder ([facility] [address] )    Quadriplegia and quadriparesis ([facility] [address] )       Other    Ambulatory dysfunction    Neurogenic bladder    Presence of cerebral intraventricular baclofen pump               Reason for visit is FU visit   Chief Complaint   Patient presents with    Virtual Regular Visit        Encounter provider Jeri Orellana MD    Provider located at [address] [address]      Recent Visits  No visits were found meeting these conditions  Showing recent visits within past 7 days and meeting all other requirements  Today's Visits  Date Type Provider Dept   09/06/22 Telephone Jeri Orellana MD [facility] [place]   09/06/22 Telemedicine Jeri Orellana MD [facility] [place]   Showing today's visits and meeting all other requirements  Future Appointments  No visits were found meeting these conditions  Showing future appointments within next 150 days and meeting all other requirements       The patient was identified by name and date of birth  Negrita Sneed was informed that this is a telemedicine visit and that the visit is being conducted through [facility] and patient was informed this is a secure, HIPAA-complaint platform  She agrees to proceed     My office door was closed  No one else was in the room  She acknowledged consent and understanding of privacy and security of the video platform  The patient has agreed to participate and understands they can discontinue the visit at any time  Patient is aware this is a billable service       Renee Borrego is a 47 y o  female with MS and relate issues  HPI   Mrs Kenneth Landry has presented to [facility] multiple [email or web address] for follow-up on multiple sclerosis related issues  Patient described no new focal neurological dysfunction considering patient is quadriplegic with left worse than right side paralysis noted  Patient presented with her  on wheelchair  We personally reviewed patient brain imaging, we agreed patient has no signs of disease progression with no signs of PML or other infectious process in brain parenchyma, mild brain volume loss appreciated likely related to secondary progressive MS neuro degenerative outcomes  Patient agreed she would like to continue Tysabri 300 mg Q 6 weeks and she would like to come back on monthly IVIG 400 mg/kg/day, patient has her infusion team at [facility], with Lalo Leyva RN provide services in the patient house ( [phone], YF [phone])  Infusions will be adjusted  q 4 weeks, or 2 weeks prior to Tysabri infusions  We also discussed patient has chronic lower back pain with neuropathic pain resistant to ever single treatment patient had previously tried, with intermittent muscle spasm which make pain worse despite baclofen time use  We discussed potential trial of low-dose naltrexone 1 5mg as patient to consider starting regimen month tablets a day for 2 weeks then increase to 2 a day as maintenance, may adjust dose to 4 5 mg a day if well tolerated and no thyroid dysfunction noted      Patient is to continue physical therapy with occupational therapy,     MRI of the brain with no contrast will be advised within 6 months  Patient has elevated BUN - with concern for potential dehydration, patient followed with PCP for abnormal serologic dysfunction ( elevated Ca, Na)     ASSESSMENT AND PLAN    Mrs Kenneth Landry has presented to [facility] multiple [email or web address] for follow-up    Since last office visit, patient described no infection, no hospitalization, no worsening MS related symptoms  Patient stated she feels dizzy generalized body weakness and intermittent nerve pain  Patient has scheduled for Tysabri infusion on September 7th, 2022  Patient was previously taking IVIG on monthly basis, patient completed serological workup and we will be proceeding with IVIG 400 mg/kg IV monthly infusions, we may consider some premedication with 250 cc of 0 9 % NS solution  Patient described no side effects or infusion reaction to IVIG  We also discussed potential consideration for LDN -family will be researching this question prior to considering trial     Patient will be receiving her 2nd booster once updated formal against omicron available on the market  Our  team will be considering involvement in financial help as patient has Medicare with no prescription plan  Patient is to follow [facility] within 4 months      History reviewed  No pertinent past medical history      Past Surgical History:   Procedure Laterality Date    GALLBLADDER SURGERY      appro 2019       Current Outpatient Medications   Medication Sig Dispense Refill    acetaminophen (TYLENOL) 500 mg tablet Take 500 mg by mouth every 6 (six) hours as needed for mild pain 2 tab a day      Ascorbic Acid (vitamin C) 1000 MG tablet Take 1,000 mg by mouth daily      atorvastatin (LIPITOR) 80 mg tablet Take 80 mg by mouth daily      Calcium Carb-Cholecalciferol (OSCAL-D) 500 mg-200 units per tablet Take 1 tablet by mouth 2 (two) times a day with meals      Cholecalciferol (Vitamin D3) 1 25 MG (20123 UT) CAPS Take by mouth      diphenhydrAMINE-APAP, sleep, (TYLENOL PM EXTRA STRENGTH PO) Take by mouth in the morning 2 a day      gemfibrozil (LOPID) 600 mg tablet TAKE 1 TABLET BY MOUTH TWICE DAILY FOR 30 DAYS      lamoTRIgine (LaMICtal) 25 mg tablet Take 2 tablets (50 mg total) by mouth daily 180 tablet 0    levothyroxine 112 mcg tablet Take 125 mcg by mouth      multivitamin (THERAGRAN) TABS Take 1 tablet by mouth daily      natalizumab (TYSABRI) 300 mg/15 mL Infuse into a venous catheter      NON FORMULARY Baclofen pump      D-MANNOSE PO Take 1,000 mg by mouth 2 (two) times a day      Immune Globulin, Human, 30 GM/300ML SOLN Infuse into a venous catheter (Patient not taking: Reported on 9/6/2022)       No current facility-administered medications for this visit  Allergies   Allergen Reactions    Levofloxacin Other (See Comments)     Seizure activity per spouse  Seizure activity per spouse      Morphine Shortness Of Breath     Chest tightness per spouse  Chest tightness per spouse      Ciprofloxacin Other (See Comments) and Seizures     seizures      Levetiracetam Other (See Comments)     Lethargy  Lethargy      Medical Tape Rash       Review of Systems   Constitutional: Negative  Negative for appetite change and fever  HENT: Negative  Negative for hearing loss, tinnitus, trouble swallowing and voice change  Eyes: Negative  Negative for photophobia and pain  Respiratory: Negative  Negative for shortness of breath  Cardiovascular: Negative  Negative for palpitations  Gastrointestinal: Negative  Negative for nausea and vomiting  Endocrine: Negative  Negative for cold intolerance  Genitourinary: Negative  Negative for dysuria, frequency and urgency  Musculoskeletal: Negative  Negative for myalgias and neck pain  Skin: Negative  Negative for rash  Neurological: Positive for dizziness, tremors, weakness and numbness  Negative for seizures, syncope, facial asymmetry, speech difficulty, light-headedness and headaches  Patient states has nerve pain every where in body  Hematological: Negative  Does not bruise/bleed easily  Psychiatric/Behavioral: Negative  Negative for confusion, hallucinations and sleep disturbance  Video Exam    There were no vitals filed for this visit      Physical Exam     I spent 16 minutes with patient today in which greater than 50% of the time was spent in counseling/coordination of care regarding Multiple sclerosis and related issues

## 2022-09-06 NOTE — TELEPHONE ENCOUNTER
Patient has Jennifer Roles scheduled on 9/7/2022 - please consider IVIg 400 mg/kg x 1 infusion in 2 weeks from now  Serologic work up was reviewed by primary team and patient was cleared for IVIg  For MSW team - please investigate financial help patient may receive for her Tysabri and no IVIg treatment  Patient  stated they do not have prescription plan with Medicare

## 2022-09-06 NOTE — TELEPHONE ENCOUNTER
Pneumonia (Adult)  Pneumonia is an infection deep within the lungs. It is in the small air sacs (alveoli). Pneumonia may be caused by a virus or bacteria. Pneumonia caused by bacteria is usually treated with an antibiotic. Severe cases may need to be treated in the hospital. Milder cases can be treated at home. Symptoms usually start to get better during the first 2 days of treatment.    Home care  Follow these guidelines when caring for yourself at home:  · Rest at home for the first 2 to 3 days, or until you feel stronger. Don’t let yourself get overly tired when you go back to your activities.  · Stay away from cigarette smoke - yours or other people’s.  · You may use acetaminophen or ibuprofen to control fever or pain, unless another medicine was prescribed. If you have chronic liver or kidney disease, talk with your healthcare provider before using these medicines. Also talk with your provider if you’ve had a stomach ulcer or gastrointestinal bleeding. Don’t give aspirin to anyone younger than 18 years of age who is ill with a fever. It may cause severe liver damage.  · Your appetite may be poor, so a light diet is fine.  · Drink 6 to 8 glasses of fluids every day to make sure you are getting enough fluids. Beverages can include water, sport drinks, sodas without caffeine, juices, tea, or soup. Fluids will help loosen secretions in the lung. This will make it easier for you to cough up the phlegm (sputum). If you also have heart or kidney disease, check with your healthcare provider before you drink extra fluids.  · Take antibiotic medicine prescribed until it is all gone, even if you are feeling better after a few days.  Follow-up care  Follow up with your healthcare provider in the next 2 to 3 days, or as advised. This is to be sure the medicine is helping you get better.  If you are 65 or older, you should get a pneumococcal vaccine and a yearly flu (influenza) shot. You should also get these vaccines if  Working on paper order       IVIG 400mg/kg/day = 27 5mg daily     Please advise over how many hours should this be infused over? Any additional pre-medication or hydration orders (tylenol and benadryl currently ordered)? Thanks! Please also confirm- will pt have IVIG every 4 weeks? Or just a one time dose? you have chronic lung disease like asthma, emphysema, or COPD. Recently, a second type of pneumonia vaccine has become available for everyone over 65 years old. This is in addition to the previous vaccine. Ask your provider about this.  When to seek medical advice  Call your healthcare provider right away if any of these occur:  · You don’t get better within the first 48 hours of treatment  · Shortness of breath gets worse  · Rapid breathing (more than 25 breaths per minute)  · Coughing up blood  · Chest pain gets worse with breathing  · Fever of 100.4°F (38°C) or higher that doesn’t get better with fever medicine  · Weakness, dizziness, or fainting that gets worse  · Thirst or dry mouth that gets worse  · Sinus pain, headache, or a stiff neck  · Chest pain not caused by coughing  Date Last Reviewed: 1/1/2017  © 3606-7002 [employer] [place]. [address] [place] [address]. All rights reserved. This information is not intended as a substitute for professional medical care. Always follow your healthcare professional's instructions.

## 2022-09-07 NOTE — TELEPHONE ENCOUNTER
FABIOLA found the following literature about Naltrexone 1 5mg online at http://[employer]/:    "Are you trying to determine if you can afford low dose naltrexone (LDN)? LDN is a non-addictive drug that can be used to treat symptoms of inflammation, immune system problems, and pain caused by Lyme disease and other autoimmune disorders  It is available only by prescription and is custom made at a compounding pharmacy  Naltrexone is commercially manufactured in 50mg tablets, but a compounding pharmacy is able to prepare low dose variations, which are needed for treating immune system disorders  Figuring out the cost of low dose naltrexone  Naltrexone was originally marketed to help with opioid addiction  In the late [de-identified], researchers found that a very low dosage of Naltrexone showed promising results in the treatment of autoimmune disorders  However, this is not an FDA-approved usage for the drug, and that means most insurance companies will not cover LDN  But you dont have to worry about your insurance not covering the medication  It turns out that LDN is an affordable option for patients to use, in addition to their traditional therapy  Low dose naltrexone is prescribed in 1 5 mg to 4 5 mg dosages  Depending upon the dosage prescribed for you, LDN therapy costs about $1 per day  Typically, you will pay less than $100 for a three months supply  You may find some websites suggesting that you purchase the more common 50 mg naltrexone tablets and dissolve them in distilled water  However, there are pitfalls to this method  Measuring the correct amount is difficult, especially with household measuring cups and spoons   Additionally, there is no data to show that naltrexone is stable in water or for how long  It really does not make sense to do this because of the concerns with stability of the liquid, measuring the right amount, and really just the hassle of it   You will not save a significant amount of money given the low cost of LDN capsules "    Please advise if you would like for me to assist patient in finding a compounding pharmacy in their area  Thanks!

## 2022-09-07 NOTE — TELEPHONE ENCOUNTER
MSW reached out to Emma Whipple, Financial Counselor, who assists patients in receiving free infusion medications to see if she is able to assist in obtaining free drugs (IVIG and Tysabri)  Sapphire Sierra stated that she will look into this and will get back to this writer

## 2022-09-07 NOTE — TELEPHONE ENCOUNTER
Sera William got back to this writer and stated that Juanjo Yusuf, another Financial Counselor in the [facility] would be the best person to contact  MSW reached out to Jose Carlos Killian to see if she could assist with obtaining free drug for Tysabri and IVIG  Joes Carlos Killian stated that she spoke with patient's , and he stated that it is not the Tysabri and IVIG that they would be having a problem with that the Dr  was discussing another new drug that is not an infusion drug that they feel won't be a covered drug  Jose Carlos Killian inquired if this writer knew what the new drug would be? Jose Carlos Killian stated that she usually does not get involved with oral drugs, but rather only works with infusion drugs but would be willing to look into assistance to see if anything would be available  Jose Carlos Killian stated that patient's  assured her that  they have no bills for her Tysabri or IVIG treatment  Jose Carlos Killian stated that patient's  was taking her to a treatment and had to go, but stated that he will call her back tomorrow  Jose Carlos Killian stated that she will update this writer after speaking with patient's  on 9/8  In the meantime, MSW will follow-up with Dr Alyssa Nevarez to clarify the request for social work assistance

## 2022-09-08 ENCOUNTER — TELEPHONE (OUTPATIENT)
Dept: NEUROLOGY | Facility: CLINIC | Age: 52
End: 2022-09-08

## 2022-09-08 NOTE — TELEPHONE ENCOUNTER
For timing of IVIG infusion-- we will be using paper orders as pt uses [facility]  This requires us to specify a certain number of hours to run infusion over  I did check with the neuromuscular nurses (they work with Uriel Yo and Kingsley Lieberman)-- they typically infuse IVIG over 5-6 hours  Please advise

## 2022-09-08 NOTE — TELEPHONE ENCOUNTER
MSW spoke with Dr Jose Hartley this date - she will discuss the next steps with patient/ about Naltrexone  MSW phoned patient's  - he assured this writer that patient does not need any financial assistance with Tysabri (has been getting it at an infusion center 5 minutes from their home for the last 15 years under her Medicare)  Patient's  stated that he does not expect any financial hardship with the IVIG at this time  The Social Work Team will be available to patient/family as needed

## 2022-09-08 NOTE — TELEPHONE ENCOUNTER
Mark Ellison MD  You 2 days ago         Patient stated she did not require Normal saline infusion for premedication  We will have monthly/4 weeks infusions  Considering timing - we will use standard timing as per protocol  Message text        You routed conversation to Mark Ellison MD 2 days ago     You 2 days ago     LD       Working on paper order       IVIG 400mg/kg/day = 27 5mg daily     Please advise over how many hours should this be infused over? Any additional pre-medication or hydration orders (tylenol and benadryl currently ordered)? Thanks!     Please also confirm- will pt have IVIG every 4 weeks? Or just a one time dose? Documentation       Audrey Nichols routed conversation to Audrey Nichols 2 days ago      Mark Ellison MD routed conversation to Sweetie Holloway; Neurology  2 days ago     Mark Ellison MD 2 days ago            Patient has Aura Carballo scheduled on 9/7/2022 - please consider IVIg 400 mg/kg x 1 infusion in 2 weeks from now  Serologic work up was reviewed by primary team and patient was cleared for IVIg

## 2022-09-19 NOTE — TELEPHONE ENCOUNTER
Paper infusion order written  Emailed to Bhavani Blackmon for [employer]  Awaiting signed order

## 2022-09-27 NOTE — TELEPHONE ENCOUNTER
Per below, pt asking for IVIG infusions to be given same week as Tysabri  Per original messages from Dr MOHAMUD, IVIG infusions to be 2 weeks prior/after Tysabri infusions  Please confirm timing and I will [facility]

## 2022-09-27 NOTE — TELEPHONE ENCOUNTER
Natty voicemail transcription:   Yes, hi, this message is for doctor tamar pulse goes office  This is Jaylyn Loredo, i'm one of the pharmacists here at [facility] calling about a mutual patient Rebekah Lim in regards to her the ID orders that we had recently received and have since got approval for due to the the question about the timing and the I understand the desire to separate the infusions tie sabri and I V I G by multiple weeks  But it seems like the patients patient in her  were not under that impression  They were, for some reason, expecting the fusions to be done on the same week which coming from my experience, we don't normally do, you know, and I, B, I, G, and fusion, and then a, ty, sabri, or oak service and fusion on the same in the same week but they seem to be under the impression of that or don't not, they don't seem to be terribly willing to do them on alternating schedules  So I'm not sure exactly how will approach that or if it's just a miscommunication with, with the patient and it's an urban  So give me a call back [phone]  And my extension is [phone]  Thanks  Summary:  IVIG infusion schedule     Calling from phone #:  [phone] ext   [address]

## 2022-09-30 NOTE — TELEPHONE ENCOUNTER
Received ELINA Sears, my name is Tom, i'm a pharmacist that work for [employer]  So the family seems unaware that  they should put 2 weeks between the Tysabri and the I V I G orders  We did actually call doctor earlier this week and I'm just following up that the patient and the caregiver are just think things can be done on the same day  And we're trying to let them know that these things have to be to 2 weeks apart  You know, as the schedule says, when you're orders, but I was hoping that somebody could just contact the patient and let them know exactly how this is going to be done  Because they're extremely confused and they want the things to be done on the same day   And they actually Really doesn't, don't believe, believe us, but we can certainly do anything  That is my phone number and I am the pharmacist here  It's [phone]  And you can just ask one of us and you know, we'll Help out answer or do whatever we need to

## 2022-10-07 NOTE — TELEPHONE ENCOUNTER
Daryl Juarez MD  You 10 days ago         We are NOT changing timing     Message text       Daryl Juarez MD  You 10 days ago         We are changing infusion timing as to avoid interference of the regimens     Message text

## 2022-10-07 NOTE — TELEPHONE ENCOUNTER
Pt's  Barbie Perez made aware (on communication consent)  He verbalizes understanding of timing of infusions  Left detailed message for pharmacist at [facility] making them aware

## 2022-10-18 DIAGNOSIS — G40.909 SEIZURE DISORDER (HCC): ICD-10-CM

## 2022-10-19 ENCOUNTER — TELEPHONE (OUTPATIENT)
Dept: NEUROLOGY | Facility: CLINIC | Age: 52
End: 2022-10-19

## 2022-10-19 RX ORDER — LAMOTRIGINE 25 MG/1
50 TABLET ORAL DAILY
Qty: 180 TABLET | Refills: 0 | Status: SHIPPED | OUTPATIENT
Start: 2022-10-19

## 2022-10-19 NOTE — TELEPHONE ENCOUNTER
Called below # and reached [facility]  Spoke with Roseline  She reports pt presented for Tysabri infusion today  Pt reported placement of tubes in bilateral ears 2 weeks ago  Went to [facility]) yesterday to complete cultures, received some type of topical treatment  No fever  Culture results not yet available  Asking if pt can proceed with Tysabri infusion  Urgent TT sent to Dr MOHAMUD    Message sent to MA  Spoke with Maile MCINTYRE who was able to get Dr Santos on the phone  Pt to be rescheduled in 1 week  Pt should contact our office if cultures are positive or if she has sighs of infection/ started on antibiotics  Ivis Gonzalez of this  She reports they did already cancel pt's infusion due to the time  Infusion center is requesting clearance to resume Tysabri infusions from our office prior to reschedule  Clearance would need to be faxed to [phone]  Roseline also educated pt and  that with any infection/illness, they should contact neurology office prior to receiving infusion to ensure she can proceed with treatment

## 2022-10-19 NOTE — TELEPHONE ENCOUNTER
Located pending cultures with [facility] in care everywhere  Awaiting results  Will f/u

## 2022-10-19 NOTE — TELEPHONE ENCOUNTER
Mariaa Jean from the [facility] has asked for a call back as soon as possible for clarification on pt's Iv treatment as pt has appointment right now  She asked to call her back at [phone] ext# [phone]    I have called the  but when transferred and call goes to an       Thank you,     Carmen Robles

## 2022-11-08 ENCOUNTER — TELEPHONE (OUTPATIENT)
Dept: NEUROLOGY | Facility: CLINIC | Age: 52
End: 2022-11-08

## 2022-11-08 NOTE — TELEPHONE ENCOUNTER
Hi, my name is Raghu  I'm one of the pharmacists from [address] [place] [address],[facility] regarding a mutual patient of Dr Mary Jo Olivares   Pt's name is Luis Alberto Posey, tena  1970  Just wanted to see if Dr Mary Jo Olivares would want to add any refills to patients  IVIG script  If you can give me a call back at [phone] extension [phone]  Thank you  Have a good day  Bye bye

## 2022-11-11 NOTE — TELEPHONE ENCOUNTER
Pt is ordered to have IVIG every 4 weeks (+/- 2 weeks from Tysabri infusions)  See below  Okay to provide verbal for 1 year of refills for IVIG?

## 2022-12-02 ENCOUNTER — TELEPHONE (OUTPATIENT)
Dept: NEUROLOGY | Facility: CLINIC | Age: 52
End: 2022-12-02

## 2022-12-02 NOTE — TELEPHONE ENCOUNTER
Danny had called in regards to needing to know if patient is still receiving certain injections  I directed the call to the nurses line

## 2022-12-06 ENCOUNTER — TELEPHONE (OUTPATIENT)
Dept: NEUROLOGY | Facility: CLINIC | Age: 52
End: 2022-12-06

## 2022-12-06 ENCOUNTER — DOCUMENTATION (OUTPATIENT)
Dept: NEUROLOGY | Facility: CLINIC | Age: 52
End: 2022-12-06

## 2022-12-06 DIAGNOSIS — G35 MS (MULTIPLE SCLEROSIS) (HCC): Primary | ICD-10-CM

## 2022-12-06 NOTE — TELEPHONE ENCOUNTER
Last JCV completed 7/12/22 (in care everywhere)  Updated JCV level due by 1/12/23  JCV order entered  [employer] message sent

## 2022-12-28 DIAGNOSIS — G40.909 SEIZURE DISORDER (HCC): ICD-10-CM

## 2022-12-28 NOTE — TELEPHONE ENCOUNTER
Pt's  left  requesting refill of lamotrigine 25mg 2 tabs daily    Only has 5 days left  [facility] [place]  Tmvqugs-[phone]    Refill entered, please sign off

## 2022-12-29 RX ORDER — LAMOTRIGINE 25 MG/1
50 TABLET ORAL DAILY
Qty: 180 TABLET | Refills: 1 | Status: SHIPPED | OUTPATIENT
Start: 2022-12-29

## 2023-01-04 ENCOUNTER — TELEPHONE (OUTPATIENT)
Dept: NEUROLOGY | Facility: CLINIC | Age: 53
End: 2023-01-04

## 2023-01-04 NOTE — TELEPHONE ENCOUNTER
Left message on voicemail      Reminder appt call     Patient is scheduled on 01/10/2023 @ 2 pm with an arrival time  145 pm in the [facility] location with Dr Parkinson Fearing

## 2023-01-10 ENCOUNTER — OFFICE VISIT (OUTPATIENT)
Dept: NEUROLOGY | Facility: CLINIC | Age: 53
End: 2023-01-10

## 2023-01-10 VITALS — TEMPERATURE: 94.7 F | DIASTOLIC BLOOD PRESSURE: 70 MMHG | SYSTOLIC BLOOD PRESSURE: 120 MMHG

## 2023-01-10 DIAGNOSIS — M62.9 DISORDER OF MUSCLE, UNSPECIFIED: ICD-10-CM

## 2023-01-10 DIAGNOSIS — G35 MS (MULTIPLE SCLEROSIS) (HCC): Primary | ICD-10-CM

## 2023-01-10 DIAGNOSIS — G40.909 SEIZURE DISORDER (HCC): ICD-10-CM

## 2023-01-10 DIAGNOSIS — G82.50 QUADRIPLEGIA AND QUADRIPARESIS (HCC): ICD-10-CM

## 2023-01-10 DIAGNOSIS — N31.9 NEUROGENIC BLADDER: ICD-10-CM

## 2023-01-10 RX ORDER — ROSUVASTATIN CALCIUM 40 MG/1
40 TABLET, COATED ORAL DAILY
COMMUNITY
Start: 2022-12-13

## 2023-01-10 RX ORDER — LAMOTRIGINE 200 MG/1
200 TABLET, FILM COATED, EXTENDED RELEASE ORAL DAILY
Qty: 90 TABLET | Refills: 3 | Status: SHIPPED | OUTPATIENT
Start: 2023-01-10

## 2023-01-10 NOTE — PROGRESS NOTES
Patient ID: Eugenie Camarena is a 47 y o  female  Assessment/Plan:           Problem List Items Addressed This Visit        Nervous and Auditory    MS (multiple sclerosis) ([facility] ) - Primary    Relevant Medications    LaMICtal  MG TB24    Other Relevant Orders    MRI brain without contrast    CBC and differential    Comprehensive metabolic panel    TSH, 3rd generation with Free T4 reflex    STRATIFY JCV(TM) AB W/RFX TO INHIBITION ASSAY    Seizure disorder (HCC)    Relevant Medications    LaMICtal  MG TB24    Quadriplegia and quadriparesis (HCC)    Relevant Orders    MRI brain without contrast       Other    Neurogenic bladder   Other Visit Diagnoses     Disorder of muscle, unspecified        Relevant Orders    TSH, 3rd generation with Free T4 reflex         Mrs López Sami has presented to [facility] sclerosis center for follow-up on multiple sclerosis and related issues  Patient has no recent infection, no recent hospitalization once within Tysabri 300 mg every 6 weeks  Patient was not able to start IVIG regimen due to insurance related questions  Patient will proceed with CBC/CMP/BRIAN virus considering recent evaluation was consistent with BRIAN virus index 0 35 which is indeterminate  Patient will have imaging of the brain without contrast scheduled for April - June 2023  PML and related issues been extensively discussed  Patient has been seizure-free as she has been taking Lamictal  mg once a day in addition to lamotrigine 50 mg at night;   Patient has been wheelchair-bound with multiple sites of her body described as a muscle spasm pain as patient has baclofen pump in place  At this point, patient is to proceed with just Tysabri infusion, once we clarify sedation with IVIG, patient will be cleared for immunoglobulin infusion once every 6 weeks which will bring the question having every 3 weeks infusion Tysabri versus IVIG, patient agreed with the plan      Patient has no signs of lower extremity edema or DVT  Patient has no significant muscle spasms with contracture of spasticity in her hands bilaterally  Patient is to continue following with [facility] within 6 months  Subjective: dizziness, tremors, weakness, spasm in both legs  HPI  Mrs Marifer Lynn presented to [facility] [address] for follow-up on multiple sclerosis related issues  Emely Smith presented with her   Patient has been taking Tysabri 300 mg infusions every 6 weeks  Patient is BRIAN virus intermediate with BRIAN virus index 0 35  Patient has never started IVIg treatment due to insurance related issues  Patient has primary immunodeficiency disorder which contributes to her advanced dementing disease  Patient stated she has baclofen pump in place at the same time patient believes worsening spasticity noted more at nighttime and when she is in supine position  Patient has no joint pain, no swelling in her lower extremities  Patient has daily stretching  Patient is planning to see ophthalmology anytime this year  Patient is quadriplegic with left worse than right side paralysis noted in upper extremities        Patient presented with her  on wheelchair  We previously reviewed patient brain imaging, we agreed patient has no signs of disease progression with no signs of PML or other infectious process in brain parenchyma, mild brain volume loss appreciated likely related to secondary progressive MS neuro degenerative outcomes    We also discussed patient has chronic lower back pain with neuropathic pain resistant to ever single treatment patient had previously tried, with intermittent muscle spasm which make pain worse despite baclofen time use   We discussed potential trial of low-dose naltrexone 1 5mg as patient to consider starting regimen month tablets a day for 2 weeks then increase to 2 a day as maintenance, may adjust dose to 4 5 mg a day if well tolerated and no thyroid dysfunction noted        The following portions of the patient's history were reviewed and updated as appropriate:   She  has no past medical history on file  She   Patient Active Problem List    Diagnosis Date Noted   • MS (multiple sclerosis) ([facility] ) 03/14/2022   • Ambulatory dysfunction 03/14/2022   • Neurogenic bladder 03/14/2022   • Presence of cerebral intraventricular baclofen pump 03/14/2022   • Frequent UTI 03/14/2022   • Seizure disorder ([place] [address] ) 03/14/2022   • Hypogammaglobulinemia ([place] [address] ) 03/14/2022   • Quadriplegia and quadriparesis ([place] [address] ) 03/14/2022     She  has a past surgical history that includes Gallbladder surgery  Her family history includes Breast cancer in her cousin and maternal aunt  She  reports that she has quit smoking  She has quit using smokeless tobacco  She reports that she does not currently use alcohol  She reports that she does not currently use drugs    Current Outpatient Medications   Medication Sig Dispense Refill   • acetaminophen (TYLENOL) 500 mg tablet Take 500 mg by mouth every 6 (six) hours as needed for mild pain 2 tab a day     • Ascorbic Acid (vitamin C) 1000 MG tablet Take 1,000 mg by mouth daily     • Calcium Carb-Cholecalciferol (OSCAL-D) 500 mg-200 units per tablet Take 1 tablet by mouth 2 (two) times a day with meals     • Cholecalciferol (Vitamin D3) 1 25 MG (00349 UT) CAPS Take by mouth     • D-MANNOSE PO Take 1,000 mg by mouth 2 (two) times a day     • diphenhydrAMINE-APAP, sleep, (TYLENOL PM EXTRA STRENGTH PO) Take by mouth in the morning 2 a day     • gemfibrozil (LOPID) 600 mg tablet TAKE 1 TABLET BY MOUTH TWICE DAILY FOR 30 DAYS     • LaMICtal  MG TB24 Take 1 tablet (200 mg total) by mouth in the morning 90 tablet 3   • lamoTRIgine (LaMICtal) 25 mg tablet Take 2 tablets (50 mg total) by mouth daily 180 tablet 1   • levothyroxine 112 mcg tablet Take 125 mcg by mouth     • multivitamin (THERAGRAN) TABS Take 1 tablet by mouth daily     • natalizumab (TYSABRI) 300 mg/15 mL Infuse into a venous catheter     • NON FORMULARY Baclofen pump     • rosuvastatin (CRESTOR) 40 MG tablet Take 40 mg by mouth daily     • atorvastatin (LIPITOR) 80 mg tablet Take 80 mg by mouth daily (Patient not taking: Reported on 1/10/2023)     • Immune Globulin, Human, 30 GM/300ML SOLN Infuse into a venous catheter (Patient not taking: Reported on 9/6/2022)       No current facility-administered medications for this visit  Current Outpatient Medications on File Prior to Visit   Medication Sig   • acetaminophen (TYLENOL) 500 mg tablet Take 500 mg by mouth every 6 (six) hours as needed for mild pain 2 tab a day   • Ascorbic Acid (vitamin C) 1000 MG tablet Take 1,000 mg by mouth daily   • Calcium Carb-Cholecalciferol (OSCAL-D) 500 mg-200 units per tablet Take 1 tablet by mouth 2 (two) times a day with meals   • Cholecalciferol (Vitamin D3) 1 25 MG (58533 UT) CAPS Take by mouth   • D-MANNOSE PO Take 1,000 mg by mouth 2 (two) times a day   • diphenhydrAMINE-APAP, sleep, (TYLENOL PM EXTRA STRENGTH PO) Take by mouth in the morning 2 a day   • gemfibrozil (LOPID) 600 mg tablet TAKE 1 TABLET BY MOUTH TWICE DAILY FOR 30 DAYS   • lamoTRIgine (LaMICtal) 25 mg tablet Take 2 tablets (50 mg total) by mouth daily   • levothyroxine 112 mcg tablet Take 125 mcg by mouth   • multivitamin (THERAGRAN) TABS Take 1 tablet by mouth daily   • natalizumab (TYSABRI) 300 mg/15 mL Infuse into a venous catheter   • NON FORMULARY Baclofen pump   • rosuvastatin (CRESTOR) 40 MG tablet Take 40 mg by mouth daily   • atorvastatin (LIPITOR) 80 mg tablet Take 80 mg by mouth daily (Patient not taking: Reported on 1/10/2023)   • Immune Globulin, Human, 30 GM/300ML SOLN Infuse into a venous catheter (Patient not taking: Reported on 9/6/2022)     No current facility-administered medications on file prior to visit  She is allergic to levofloxacin, morphine, ciprofloxacin, levetiracetam, and medical tape  [place] Objective:    Blood pressure 120/70, temperature (!) 94 7 °F (34 8 °C), temperature source Skin  Physical Exam    Neurological Exam  CONSTITUTIONAL: NAD, pleasant  NECK: supple, no lymphadenopathy, no thyromegaly, no JVD  CARDIOVASCULAR: RRR, normal S1S2, no murmurs, no rubs  RESP: clear to auscultation bilaterally, no wheezes/rhonchi/rales  ABDOMEN: soft, non tender, non distended  SKIN: no rash or skin lesions  EXTREMITIES: no edema, pulses 2+bilaterally  PSYCH: appropriate mood and affect  NEUROLOGIC COMPREHENSIVE EXAM: Patient is oriented to person, place and time, NAD; appropriate affect  CN II, III, IV, V, VI, VII,VIII,IX,X,XI-XII intact with EOMI, PERRLA, OKN intact, VF grossly intact, fundi poorly visualized secondary to pupillary constriction; symmetric face noted  Motor: 3/5 RUE shoulder abduction and 2/5  with 2/5 LUE shoulder abduction, 1/5  and inability to extend her fingers; LE 0/5 bilateral symmetric; Sensory: intact to light touch and pinprick bilaterally; diminished vibration sensation feet bilaterally; Coordination not evaluated; DTR: 1/4 through, no Babinski, no clonus  Donis Route bound  ROS:    Review of Systems   Constitutional: Negative  Negative for appetite change and fever  HENT: Negative  Negative for hearing loss, tinnitus, trouble swallowing and voice change  Eyes: Negative  Negative for photophobia, pain and visual disturbance  Respiratory: Negative  Negative for shortness of breath  Cardiovascular: Negative  Negative for palpitations  Gastrointestinal: Negative  Negative for nausea and vomiting  Endocrine: Negative  Negative for cold intolerance  Genitourinary: Negative  Negative for dysuria, frequency and urgency  Musculoskeletal: Negative  Negative for gait problem, myalgias and neck pain  Skin: Negative  Negative for rash  Allergic/Immunologic: Negative      Neurological: Positive for dizziness, tremors, weakness and light-headedness  Negative for seizures, syncope, facial asymmetry, speech difficulty, numbness and headaches  Spasm both legs    Hematological: Negative  Does not bruise/bleed easily  Psychiatric/Behavioral: Negative  Negative for confusion, hallucinations and sleep disturbance

## 2023-02-01 ENCOUNTER — TELEPHONE (OUTPATIENT)
Dept: NEUROLOGY | Facility: CLINIC | Age: 53
End: 2023-02-01

## 2023-02-01 NOTE — TELEPHONE ENCOUNTER
Hi, this is Evone Finical, i'm one of the pharmacists of [employer]  I'm not entirely sure, in retrospect how this all went down  But back in November we had been working on a referral for Juan Carlos Lay to do I V I G and fusions  Sounds like she gets charged  Sabri infusions up at [facility] every 6 weeks, and then I V I G to be done at home every 4 weeks  But the problem that turned out to exist after the fact ,was that the patient for her diagnosis is not covered for IVIG infusions in the home  One of our sales (msg garbled and undecipherable here) or late november because based on what we can see, her only option would be to get her infusions at the hospital similar to how she gets her Tysabri  But from speaking with patients,  today, it sounds like they still weren't aware of any of that and were still waiting on us to provide the medication, which we can't do based off the information we have  So we needed to either get updated or new information that would change the dynamic of that potentially  Or we'll just make sure that everyone's on the same page with having to be done alternate site of care  So can you please give me a callback? Once again, Apollo-pharmacist [phone] my extension is [phone]  Thanks

## 2023-02-01 NOTE — TELEPHONE ENCOUNTER
Spoke w/[facility] of [facility]  Advised him our office was not made aware that patient did not have a covered diagnosis for IVIG infusions in the home  Missy Staton as he believed both our office and patient were notified that patient does not have a medicare covered diagnosis    Previous patient of Dr Leonardo Rico  She was receiving IVIG infusions in the home d/t Dr Jose Park diagnosis of immunodeficiency  However, per review of Dr Jose Park and [facility]'s clinical notes, there is no evidence of an immunodeficiency  This was somehow not discovered when patient was receiving IVIG infusions thru Dr Jose Park office  Medicare will not cover home infusions for diagnosis provided  Patient does not have Part D coverage; therefore, she can only receive infusions in hospital or outpatient setting  Per 1/2023 LOV note it appears patient was aware of issues w/IVIG home infusions:  Patient was not able to start IVIG regimen due to insurance related questions  Per Lior Coto, patient has 3 options:  · Receive IVIG infusions in outpatient or hospital setting  · Discontinue IVIG infusions  · See an immunologist; have appropriate labs, testing completed  If diagnosis of immunodeficiency can be established, then patient would be able to receive IVIG infusions in the home  Spoke w/pt's  Tomer Wang  He states he advised Lior Coto at [facility] that pt would be willing to receive IVIG infusions at the [facility]  IVIG written order completed and scanned into pt's chart  Called [facility]  Unable to reach staff  Left msg requesting return call w/date of last Tysabri infusion  [phone] Tameka Bond - please print IVIG infusion order and have Dr Ciro Pittman sign  Please fax signed order along w/insurance card to fax number on order  Thank you!

## 2023-02-03 NOTE — TELEPHONE ENCOUNTER
IVIG written order signed  Faxed along w/demographics, med rec, insurance cards and clinical notes to [facility] @ [phone]

## 2023-02-03 NOTE — TELEPHONE ENCOUNTER
Per [facility] website:    Most Recent Infusion 01/23/2023  Next Scheduled Infusion 03/07/2023      Previous IVIG order in media dated 10/3/22  Will need new order  Pt receives Tysabri every 6 weeks  IVIG ordered originally for every 4 weeks, 2 weeks prior to each Tysabri infusion  Should IVIG infusions be adjusted to every 6 weeks, 2 weeks prior to each Tysabri infusion?

## 2023-02-09 NOTE — TELEPHONE ENCOUNTER
Received voicemail from [facility] with [facility] infusion room  She reports they do not infuse IVIG at their location  Pt would need to find another site for this infusion  [phone] ext [phone]    Per below, home infusion is not covered  I was unable to locate other infusion centers that were less than 1 hour from pt  Dr Santos, please see below notes  Unable to locate infusion suite to service pt in her area  Home infusion not covered with current diagnosis  Would you like to discontinue IVIG infusions or have pt see immunologist? (see below remaining options recommended by [employer])     -Discontinue IVIG infusions    -See an immunologist; have appropriate labs, testing completed  If diagnosis of immunodeficiency can be established, then patient would be able to receive IVIG infusions in the home

## 2023-02-09 NOTE — TELEPHONE ENCOUNTER
Called [facility] [phone]  Unable to reach staff  Left msg requesting return call confirming receipt of IVIG order form and status of infusions  Awaiting call back from infusion ctr

## 2023-02-09 NOTE — TELEPHONE ENCOUNTER
Patient initially presented with hypogammaglobulinemia IgG and IgM, but repeated testing supported patient has resolution of underlying immunodeficiency and she has normal serologic findings now       Please discontinue IVIG

## 2023-02-14 NOTE — TELEPHONE ENCOUNTER
February 14, 2023        Dennys Linares MD  [address] [facility]  [place] [address]             [place] [facility]  [address] [address]  Phone: [phone]  Fax: [phone]   Patient: Leatha Adames   MR Number: [id number]   YOB: 1936   Date of Visit: 2/14/2023       Dear Dr. Linares:    Thank you for referring Leatha Adames to me for evaluation. Attached you will find relevant portions of my assessment and plan of care.    If you have questions, please do not hesitate to call me. I look forward to following Leatha Adames along with you.    Sincerely,      MD SANIYA Naranjo MD Lam D Nguyen, MD    [employer]          Patient is establishing her care - patient has been taking Tysabri infusion q 4 weeks for many years with JCV positive status  Patient signed [employer] form  We will continue Tysbari q6 weeks for now  Patient lives in [place] and prefers to have infusions locally  Next Tysbari 300 mg IV infusion will be scheduled on/around  April 20     Patient has decreased IgG and IgM with recurrent UTIs likely due to Tysabri treatment, but patient also takes monthly IVIg - Our practice will hold of IVIg regimen till patient is evaluated by Hematology here or in [facility]

## 2023-02-15 NOTE — TELEPHONE ENCOUNTER
Spoke w/pt's  Mikhail Cristina  Advised him of note below   verbalized understanding  He was at the grocery store and said he could not really talk  Advised  if he had further questions to please contact our office

## 2023-05-22 DIAGNOSIS — G40.909 SEIZURE DISORDER (HCC): ICD-10-CM

## 2023-05-22 RX ORDER — LAMOTRIGINE 25 MG/1
50 TABLET ORAL DAILY
Qty: 180 TABLET | Refills: 3 | Status: SHIPPED | OUTPATIENT
Start: 2023-05-22

## 2023-06-02 ENCOUNTER — TELEPHONE (OUTPATIENT)
Dept: NEUROLOGY | Facility: CLINIC | Age: 53
End: 2023-06-02

## 2023-06-02 ENCOUNTER — PATIENT MESSAGE (OUTPATIENT)
Dept: NEUROLOGY | Facility: CLINIC | Age: 53
End: 2023-06-02

## 2023-06-02 NOTE — TELEPHONE ENCOUNTER
Tysabri touch reauthorization submitted, valid until 01/06/24 at [facility]  [facility] msg sent to patient reminding her of required updated [address] lab

## 2023-06-02 NOTE — TELEPHONE ENCOUNTER
June 2, 2023  Me  to [facility]       6/2/23  5:38 PM  Ren Neff,      You will be due for updated JCV testing by 7/12/23 to continue receiving Tysabri infusions  I have entered a script in the [facility] system  If you would prefer to go to [facility] and utilize the free patient program, please let us know so we can provide you with a script       Please respond to this message and let us know when and where you will be going so we can keep a lookout for your results   Please note results can take 10 to 14 days to be received      Have a good day!     Maile SERRA RN

## 2023-06-26 ENCOUNTER — TELEPHONE (OUTPATIENT)
Dept: NEUROLOGY | Facility: CLINIC | Age: 53
End: 2023-06-26

## 2023-06-26 NOTE — TELEPHONE ENCOUNTER
Pt was trying to schedule MRI at [facility] in [place]  They said the orders they were sent does not have Dr MOHAMUD's signature on it  Please assist    Fax is [phone]    Please call pt once its faxed so they can follow up with [place]

## 2023-06-28 NOTE — TELEPHONE ENCOUNTER
Patient  called [facility] still did not receive the MRI order with Dr Jossie Weber   Please fax it to [phone]

## 2023-07-11 ENCOUNTER — TELEMEDICINE (OUTPATIENT)
Dept: NEUROLOGY | Facility: CLINIC | Age: 53
End: 2023-07-11
Payer: MEDICARE

## 2023-07-11 VITALS — BODY MASS INDEX: 25.84 KG/M2 | HEIGHT: 67 IN

## 2023-07-11 DIAGNOSIS — M16.7 OTHER SECONDARY OSTEOARTHRITIS OF LEFT HIP: ICD-10-CM

## 2023-07-11 DIAGNOSIS — G35 MS (MULTIPLE SCLEROSIS) (HCC): Primary | ICD-10-CM

## 2023-07-11 DIAGNOSIS — G40.909 SEIZURE DISORDER (HCC): ICD-10-CM

## 2023-07-11 DIAGNOSIS — D80.1 HYPOGAMMAGLOBULINEMIA (HCC): ICD-10-CM

## 2023-07-11 DIAGNOSIS — G82.50 QUADRIPLEGIA AND QUADRIPARESIS (HCC): ICD-10-CM

## 2023-07-11 DIAGNOSIS — R26.2 AMBULATORY DYSFUNCTION: ICD-10-CM

## 2023-07-11 DIAGNOSIS — Z79.620 LONG TERM (CURRENT) USE OF IMMUNOSUPPRESSIVE BIOLOGIC: ICD-10-CM

## 2023-07-11 DIAGNOSIS — G89.29 CHRONIC LEFT HIP PAIN: ICD-10-CM

## 2023-07-11 DIAGNOSIS — D84.89 PRIMARY IMMUNODEFICIENCY DISORDER (HCC): ICD-10-CM

## 2023-07-11 DIAGNOSIS — M25.552 CHRONIC LEFT HIP PAIN: ICD-10-CM

## 2023-07-11 DIAGNOSIS — N31.9 NEUROGENIC BLADDER: ICD-10-CM

## 2023-07-11 PROCEDURE — 99214 OFFICE O/P EST MOD 30 MIN: CPT | Performed by: PSYCHIATRY & NEUROLOGY

## 2023-07-11 RX ORDER — CELECOXIB 100 MG/1
100 CAPSULE ORAL DAILY
Qty: 20 CAPSULE | Refills: 0 | Status: SHIPPED | OUTPATIENT
Start: 2023-07-11

## 2023-07-11 NOTE — PROGRESS NOTES
Virtual Regular Visit    Verification of patient location:Home  Patient is located at  in the following state in which I hold an active license       Assessment/Plan:    Problem List Items Addressed This Visit        Nervous and Auditory    MS (multiple sclerosis) ([address]) - Primary    Relevant Medications    celecoxib (CeleBREX) 100 mg capsule    Other Relevant Orders    DXA bone density spine hip and pelvis    XR hip/pelv 2-3 vws left if performed    Seizure disorder ([address])    Quadriplegia and quadriparesis (HCC)    Relevant Orders    DXA bone density spine hip and pelvis    XR hip/pelv 2-3 vws left if performed       Other    Ambulatory dysfunction    Neurogenic bladder    Hypogammaglobulinemia ([address])    Long term (current) use of immunosuppressive biologic    Relevant Orders    DXA bone density spine hip and pelvis    XR hip/pelv 2-3 vws left if performed    Chronic left hip pain    Relevant Medications    celecoxib (CeleBREX) 100 mg capsule    Other Relevant Orders    DXA bone density spine hip and pelvis   Other Visit Diagnoses     Other secondary osteoarthritis of left hip        Relevant Medications    celecoxib (CeleBREX) 100 mg capsule    Other Relevant Orders    XR hip/pelv 2-3 vws left if performed    Primary immunodeficiency disorder ([address])        Relevant Medications    celecoxib (CeleBREX) 100 mg capsule               Reason for visit is No chief complaint on file. Encounter provider Liliya Graf MD    Provider located at [address] [place] [id number]      Recent Visits  No visits were found meeting these conditions.   Showing recent visits within past 7 days and meeting all other requirements  Today's Visits  Date Type Provider Dept   07/11/23 [facility]medicine [facility]   Showing today's visits and meeting all other requirements  Future Appointments  No visits were found meeting these conditions. Showing future appointments within next 150 days and meeting all other requirements       The patient was identified by name and date of birth. Marcia Sosa was informed that this is a telemedicine visit and that the visit is being conducted through the [employer]. She agrees to proceed. .  My office door was closed. No one else was in the room. She acknowledged consent and understanding of privacy and security of the video platform. The patient has agreed to participate and understands they can discontinue the visit at any time. Patient is aware this is a billable service. Adalid Gaitan is a 47 y.o. female with MS and related issues. HPI   Mrs. Renetta Ingram has presented to [facility] multiple sclerosis center for follow-up on multiple sclerosis and related issues. Today's concerns are: weakness, numbness-all over body. Patient states gets spasms arms and legs. Main concern today was also profound pain in left hip and into 2019 patient had osteoarthritis noted on her left hip x-ray. Patient and her  agree that primary care physician ordered MRI of the left hip, with multiple regimens had tried previously with no significant benefits reported. We discussed patient repeated BRIAN virus 0.35 with potential risk of developing PML has been reviewed. Patient has scheduled brain imaging on July 24, 2023 as we will review results once available. Patient has been seizure-free as she has been taking Lamictal  mg once a day in addition to lamotrigine 50 mg at night;   Patient has been bed-bound with multiple sites of her body described as a muscle spasm pain as patient has baclofen pump in place.   At this point, patient is to continue with just Tysabri infusion, we were considering  IVIG, patient will be cleared for immunoglobulin infusion once every 6 weeks which will bring the question having every 3 weeks infusion Tysabri versus IVIG, patient agreed with the plan.     Assessment and plan    Mrs. Chaparrita Aranda has presented to [facility] multiple sclerosis center for follow-up on multiple sclerosis related issues. Patient describes no recent infection, no hospitalization with main concern today was indeterminate BRIAN virus which has been at the same range in December 2022 and June 2023. Patient has been taking Tysabri 300 mg with no infusion reaction reported, no recent urine tract infection described. We discussed potential concern for PML-MRI of the brain will be scheduled on April 24, 2023;    Patient continue describing left hip pain with osteoarthritis and left hip reported on x-ray completed in 2019. Patient would definitely prefer to consider a DEXA scan saying patient has been bedridden with osteoporosis and other bone comorbidities might be a high concern. Celebrex 100 mg was offered to her during this office visit as patient will likely advised to follow-up with orthopedic team for arthrocentesis and other related considerations. Patient has never been able to receive IVIG-patient will be advised to having a pill again for primary immunodeficiency related condition such as multiple sclerosis where IVIG on monthly basis will be highly advised to help the patient preserved her function. Patient will be advised to follow-up with neuro immunology Dr. Leonardo East once he is available in our practice, meanwhile patient is to follow-up with [facility] neurology within 3 months. History reviewed. No pertinent past medical history.     Past Surgical History:   Procedure Laterality Date   • GALLBLADDER SURGERY      appro 2019       Current Outpatient Medications   Medication Sig Dispense Refill   • acetaminophen (TYLENOL) 500 mg tablet Take 500 mg by mouth every 6 (six) hours as needed for mild pain 2 tab a day     • Ascorbic Acid (vitamin C) 1000 MG tablet Take 1,000 mg by mouth daily     • atorvastatin (LIPITOR) 80 mg tablet Take 80 mg by mouth daily     • Calcium Carb-Cholecalciferol (OSCAL-D) 500 mg-200 units per tablet Take 1 tablet by mouth 2 (two) times a day with meals     • celecoxib (CeleBREX) 100 mg capsule Take 1 capsule (100 mg total) by mouth daily 20 capsule 0   • Cholecalciferol (Vitamin D3) 1.25 MG (18692 UT) CAPS Take by mouth     • D-MANNOSE PO Take 1,000 mg by mouth 2 (two) times a day     • diphenhydrAMINE-APAP, sleep, (TYLENOL PM EXTRA STRENGTH PO) Take by mouth in the morning 2 a day     • gemfibrozil (LOPID) 600 mg tablet TAKE 1 TABLET BY MOUTH TWICE DAILY FOR 30 DAYS     • LaMICtal  MG TB24 Take 1 tablet (200 mg total) by mouth in the morning 90 tablet 3   • lamoTRIgine (LaMICtal) 25 mg tablet Take 2 tablets (50 mg total) by mouth daily 180 tablet 3   • levothyroxine 112 mcg tablet Take 125 mcg by mouth     • multivitamin (THERAGRAN) TABS Take 1 tablet by mouth daily     • natalizumab (TYSABRI) 300 mg/15 mL Infuse into a venous catheter     • NON FORMULARY Baclofen pump     • rosuvastatin (CRESTOR) 40 MG tablet Take 40 mg by mouth daily     • Immune Globulin, Human, 30 GM/300ML SOLN Infuse into a venous catheter (Patient not taking: Reported on 9/6/2022)       No current facility-administered medications for this visit. Allergies   Allergen Reactions   • Levofloxacin Other (See Comments)     Seizure activity per spouse  Seizure activity per spouse     • Morphine Shortness Of Breath     Chest tightness per spouse  Chest tightness per spouse     • Ciprofloxacin Other (See Comments) and Seizures     seizures     • Levetiracetam Other (See Comments)     Lethargy  Lethargy     • Medical Tape Rash       Review of Systems   Constitutional: Negative for appetite change, fatigue and fever. HENT: Negative. Negative for hearing loss, tinnitus, trouble swallowing and voice change. Eyes: Negative. Negative for photophobia, pain and visual disturbance. Respiratory: Negative. Negative for shortness of breath. Cardiovascular: Negative. Negative for palpitations. Gastrointestinal: Negative. Negative for nausea and vomiting. Endocrine: Negative. Negative for cold intolerance. Genitourinary: Negative. Negative for dysuria, frequency and urgency. Musculoskeletal: Negative for back pain, gait problem, myalgias and neck pain. Skin: Negative. Negative for rash. Allergic/Immunologic: Negative. Neurological: Positive for weakness and numbness (all over body). Negative for dizziness, tremors, seizures, syncope, facial asymmetry, speech difficulty, light-headedness and headaches. Patient states gets spasms arms and legs   Hematological: Negative. Does not bruise/bleed easily. Psychiatric/Behavioral: Negative. Negative for confusion, hallucinations and sleep disturbance.        Video Exam    Vitals:    07/11/23 1420   Height: 5' 7" (1.702 m)       Physical Exam     Visit Time  Total Visit Duration: 21 min

## 2023-09-28 ENCOUNTER — TELEPHONE (OUTPATIENT)
Dept: NEUROLOGY | Facility: CLINIC | Age: 53
End: 2023-09-28

## 2023-09-28 NOTE — TELEPHONE ENCOUNTER
1ST ATTEMPT,     Called pt no answer, LMOM. Thank you,     MD Yunior Carreno MD; Yas Mcmanus; uNha Trejo RN  Yes I approve the [facility]. Thank you. [address] [place]           Previous Messages       ----- Message -----   From: Yunior Phelan MD   Sent: 9/14/2023   3:21 PM EDT   To: Nuha Trejo RN; Mary Martin; *   Subject: FW: TERRY Ruiz-     Please proceed with scheduling the patient with Dr. Carrie Walsh - [facility]      Thanks

## 2023-10-19 ENCOUNTER — TELEPHONE (OUTPATIENT)
Dept: NEUROLOGY | Facility: CLINIC | Age: 53
End: 2023-10-19

## 2023-10-19 NOTE — TELEPHONE ENCOUNTER
10/17/23 at 2:30    Hi, my name is Hannah. I am calling from the [facility] center. I just called and left a message in regards to [facility] and I apologize, but I needed to make a correction. I said that the tysabri order needed to be every 4 weeks, but she gets it every 6 weeks. Again, I was calling back on [facility], 1970 requested as a tysabri order. And I just want to make a correction, instead of every 4 weeks it needs to be every 6 weeks. Thank you.  Bye.

## 2023-10-19 NOTE — TELEPHONE ENCOUNTER
10/17/23 at 2:22    Hi, my name is Hannah. I am calling from your [facility] with Dr. Nick Urrutia office. We have a patient here, Giuliana Barillas, 1970, pt Dr Marylen Jacobsen. She received her tysabri infusion here and I've sent to fax is requesting updated orders and I haven't received anything yet. So I'm requesting an updated order just to keep current on her file. She gets tysabri 300 milligrams IV every 4 weeks. She refuses the observation. So if we did have a note in that to request to skip the observation period at the last hour. And she does not take the ? or claritin and she also refuses that. So that does not need to be part of the order either. The fax number where it can be sent to. It's  [phone]. If you have any questions, the phone number here is [phone] extension is [phone]. Again, I'm just requesting an updated order for her tysabri 300 milligrams IV every 4 weeks. And to be able to skip the observation period as long as the patient is feeling okay. Um, thank you and have a good day.

## 2023-10-20 NOTE — TELEPHONE ENCOUNTER
Pt gets 300mg IV every 6 weeks of the Tysabri. No observation and no pre meds.    Relaying the message from [facility]

## 2023-10-20 NOTE — TELEPHONE ENCOUNTER
Infusion order completed. Emailed to [facility] [employer] for 's signature. Please fax to [phone] when signed and scan into the chart. Thank you!

## 2023-10-20 NOTE — TELEPHONE ENCOUNTER
Last order in media dated 3/24/22. Left voicemail for [facility] asking to confirm if pt is taking any pre-meds prior to Tysabri or refusing all. Awaiting call back.

## 2023-10-23 ENCOUNTER — OFFICE VISIT (OUTPATIENT)
Dept: NEUROLOGY | Facility: CLINIC | Age: 53
End: 2023-10-23
Payer: MEDICARE

## 2023-10-23 VITALS
SYSTOLIC BLOOD PRESSURE: 128 MMHG | HEIGHT: 67 IN | TEMPERATURE: 97.3 F | HEART RATE: 98 BPM | DIASTOLIC BLOOD PRESSURE: 84 MMHG | BODY MASS INDEX: 25.84 KG/M2

## 2023-10-23 DIAGNOSIS — G35 MS (MULTIPLE SCLEROSIS) (HCC): Primary | ICD-10-CM

## 2023-10-23 DIAGNOSIS — G89.29 CHRONIC LEFT HIP PAIN: ICD-10-CM

## 2023-10-23 DIAGNOSIS — M25.552 CHRONIC LEFT HIP PAIN: ICD-10-CM

## 2023-10-23 DIAGNOSIS — D84.89 PRIMARY IMMUNODEFICIENCY DISORDER (HCC): ICD-10-CM

## 2023-10-23 DIAGNOSIS — D80.1 HYPOGAMMAGLOBULINEMIA (HCC): ICD-10-CM

## 2023-10-23 DIAGNOSIS — G40.909 SEIZURE DISORDER (HCC): ICD-10-CM

## 2023-10-23 PROCEDURE — 99215 OFFICE O/P EST HI 40 MIN: CPT | Performed by: PSYCHIATRY & NEUROLOGY

## 2023-10-23 RX ORDER — EZETIMIBE 10 MG/1
10 TABLET ORAL DAILY
COMMUNITY
Start: 2023-10-04

## 2023-10-23 RX ORDER — CEPHALEXIN 500 MG/1
CAPSULE ORAL
COMMUNITY
Start: 2023-10-11

## 2023-10-23 RX ORDER — ERGOCALCIFEROL 1.25 MG/1
50000 CAPSULE ORAL WEEKLY
COMMUNITY
Start: 2023-10-04

## 2023-10-23 NOTE — PROGRESS NOTES
Patient ID: [facility] is a 47 y.o. female. Assessment/Plan:           Problem List Items Addressed This Visit          Nervous and Auditory    MS (multiple sclerosis) ([address]) - Primary    Seizure disorder ([address])       Other    Hypogammaglobulinemia ([address])    Chronic left hip pain    Primary immunodeficiency disorder [facility])      Mrs. Isabell Peña has presented to [facility] [place] for follow-up on multiple sclerosis with issues. Patient has progressive form of multiple sclerosis as she has been reasonably stable while taking Tysabri 300 mg every 6 weeks. No recent infection described. Patient started experiencing recent UTIs, patient  will take urine sample and bring to urology. Patient also described having achiness in the ears with remote history of tube placement by ENT team.  Patient has been reported fungal cultures has been pending. Patient is off Lyrica/TCA-feels more awake as she was able to keep conversation. No significant stiffness noted on today's evaluation as patient has great functioning baclofen pump which requires reevaluation within 11 months. Patient reported having more pressure ulcers. Patient follows with spine pain provider with no injection required considering patient continued describing upper buttock pain which previously brought concern for piriformis syndrome vs buttock pain related to spinal cord demyelination. DEXA scan and x-ray of the hips has been pending. We will reevaluate the situation and how we can help the patient resuming IVIG is on monthly basis. Subjective:    HPI  Mrs. Isabell Peña has presented to [facility] [place] for follow-up on multiple sclerosis related issues. Patient describes no recent infection, no hospitalization with main concern today was indeterminate BRIAN virus which has been at the same range in December 2022 and June 2023.   Patient has been taking Tysabri 300 mg with no infusion reaction reported, no recent urine tract infection described. We discussed potential concern for PML-MRI of the brain pleated on July 24, 2023 patient is here today to discuss her findings. Patient continue describing left hip pain with osteoarthritis and left hip reported on x-ray completed in 2019. Believes she has started experiencing more urinary tract infections recently as she is not feeling well. Patient  will take the sample of urine and bring to urology team.  Patient started describing pain in her ears. Patient has been has multiple adjustments of the body the patient with multiple stretching technique been applied. Patient has no challenges with baclofen pump. Patient believes that she is more awake when she is off Lyrica and tricyclic antidepressants. The following portions of the patient's history were reviewed and updated as appropriate: She  has no past medical history on file. She   Patient Active Problem List    Diagnosis Date Noted    Primary immunodeficiency disorder ([address]) 10/23/2023    Long term (current) use of immunosuppressive biologic 07/11/2023    Chronic left hip pain 07/11/2023    MS (multiple sclerosis) ([address]) 03/14/2022    Ambulatory dysfunction 03/14/2022    Neurogenic bladder 03/14/2022    Presence of cerebral intraventricular baclofen pump 03/14/2022    Frequent UTI 03/14/2022    Seizure disorder ([address]) 03/14/2022    Hypogammaglobulinemia ([address]) 03/14/2022    Quadriplegia and quadriparesis ([address]) 03/14/2022     She  has a past surgical history that includes Gallbladder surgery. Her family history includes Breast cancer in her cousin and maternal aunt. She  reports that she has quit smoking. She has quit using smokeless tobacco. She reports that she does not currently use alcohol. She reports that she does not currently use drugs.   Current Outpatient Medications   Medication Sig Dispense Refill    acetaminophen (TYLENOL) 500 mg tablet Take 500 mg by mouth every 6 (six) hours as needed for mild pain 2 tab a day      Ascorbic Acid (vitamin C) 1000 MG tablet Take 1,000 mg by mouth daily      atorvastatin (LIPITOR) 80 mg tablet Take 80 mg by mouth daily      Calcium Carb-Cholecalciferol (OSCAL-D) 500 mg-200 units per tablet Take 1 tablet by mouth every other day      cephalexin (KEFLEX) 500 mg capsule TAKE 1 CAPSULE BY MOUTH ONCE DAILY AS DIRECTED      Cholecalciferol (Vitamin D3) 1.25 MG (39719 UT) CAPS Take by mouth      D-MANNOSE PO Take 1,000 mg by mouth 2 (two) times a day      diphenhydrAMINE-APAP, sleep, (TYLENOL PM EXTRA STRENGTH PO) Take by mouth in the morning 2 a day      ergocalciferol (VITAMIN D2) 50,000 units Take 50,000 Units by mouth once a week      ezetimibe (ZETIA) 10 mg tablet Take 10 mg by mouth daily      gemfibrozil (LOPID) 600 mg tablet TAKE 1 TABLET BY MOUTH TWICE DAILY FOR 30 DAYS      LaMICtal  MG TB24 Take 1 tablet (200 mg total) by mouth in the morning 90 tablet 3    lamoTRIgine (LaMICtal) 25 mg tablet Take 2 tablets (50 mg total) by mouth daily 180 tablet 3    levothyroxine 112 mcg tablet Take 112 mcg by mouth      multivitamin (THERAGRAN) TABS Take 1 tablet by mouth daily      natalizumab (TYSABRI) 300 mg/15 mL Infuse into a venous catheter      NON FORMULARY Baclofen pump      rosuvastatin (CRESTOR) 40 MG tablet Take 40 mg by mouth daily      celecoxib (CeleBREX) 100 mg capsule Take 1 capsule (100 mg total) by mouth daily (Patient not taking: Reported on 10/23/2023) 20 capsule 0    Immune Globulin, Human, 30 GM/300ML SOLN Infuse into a venous catheter (Patient not taking: Reported on 9/6/2022)       No current facility-administered medications for this visit.      Current Outpatient Medications on File Prior to Visit   Medication Sig    acetaminophen (TYLENOL) 500 mg tablet Take 500 mg by mouth every 6 (six) hours as needed for mild pain 2 tab a day    Ascorbic Acid (vitamin C) 1000 MG tablet Take 1,000 mg by mouth daily    atorvastatin (LIPITOR) 80 mg tablet Take 80 mg by mouth daily    Calcium Carb-Cholecalciferol (OSCAL-D) 500 mg-200 units per tablet Take 1 tablet by mouth every other day    cephalexin (KEFLEX) 500 mg capsule TAKE 1 CAPSULE BY MOUTH ONCE DAILY AS DIRECTED    Cholecalciferol (Vitamin D3) 1.25 MG (00357 UT) CAPS Take by mouth    D-MANNOSE PO Take 1,000 mg by mouth 2 (two) times a day    diphenhydrAMINE-APAP, sleep, (TYLENOL PM EXTRA STRENGTH PO) Take by mouth in the morning 2 a day    ergocalciferol (VITAMIN D2) 50,000 units Take 50,000 Units by mouth once a week    ezetimibe (ZETIA) 10 mg tablet Take 10 mg by mouth daily    gemfibrozil (LOPID) 600 mg tablet TAKE 1 TABLET BY MOUTH TWICE DAILY FOR 30 DAYS    LaMICtal  MG TB24 Take 1 tablet (200 mg total) by mouth in the morning    lamoTRIgine (LaMICtal) 25 mg tablet Take 2 tablets (50 mg total) by mouth daily    levothyroxine 112 mcg tablet Take 112 mcg by mouth    multivitamin (THERAGRAN) TABS Take 1 tablet by mouth daily    natalizumab (TYSABRI) 300 mg/15 mL Infuse into a venous catheter    NON FORMULARY Baclofen pump    rosuvastatin (CRESTOR) 40 MG tablet Take 40 mg by mouth daily    celecoxib (CeleBREX) 100 mg capsule Take 1 capsule (100 mg total) by mouth daily (Patient not taking: Reported on 10/23/2023)    Immune Globulin, Human, 30 GM/300ML SOLN Infuse into a venous catheter (Patient not taking: Reported on 9/6/2022)     No current facility-administered medications on file prior to visit. She is allergic to levofloxacin, morphine, ciprofloxacin, levetiracetam, and medical tape. .         Objective:    Blood pressure 128/84, pulse 98, temperature (!) 97.3 °F (36.3 °C), height 5' 7" (1.702 m). Physical Exam    Neurological Exam  CONSTITUTIONAL: NAD, pleasant. NECK: supple, no lymphadenopathy, no thyromegaly, no JVD. CARDIOVASCULAR: RRR, normal S1S2, no murmurs, no rubs. RESP: clear to auscultation bilaterally, no wheezes/rhonchi/rales.  ABDOMEN: soft, non tender, non distended. SKIN: no rash or skin lesions. EXTREMITIES: no edema, pulses 2+bilaterally. PSYCH: appropriate mood and affect  NEUROLOGIC COMPREHENSIVE EXAM: Patient is oriented to person, place and time, NAD; appropriate affect. CN II, III, IV, V, VI, VII,VIII,IX,X,XI-XII intact with EOMI, PERRLA, OKN intact, VF grossly intact, fundi poorly visualized secondary to pupillary constriction; symmetric face noted. Motor: 3/5 RUE shoulder abduction and 2/5  with 2/5 LUE shoulder abduction, 1/5  and inability to extend her fingers; LE 0/5 bilateral symmetric; Sensory: intact to light touch and pinprick bilaterally; diminished vibration sensation feet bilaterally; Coordination not evaluated; DTR: 1/4 through, no Babinski, no clonus. Tandem -wheelchair bound. ROS:    Review of Systems   Constitutional:  Negative for appetite change, fatigue and fever. HENT: Negative. Negative for hearing loss, tinnitus, trouble swallowing and voice change. Eyes: Negative. Negative for photophobia, pain and visual disturbance. Respiratory: Negative. Negative for shortness of breath. Cardiovascular: Negative. Negative for palpitations. Gastrointestinal: Negative. Negative for nausea and vomiting. Endocrine: Negative. Negative for cold intolerance. Genitourinary: Negative. Negative for dysuria, frequency and urgency. Musculoskeletal:  Negative for back pain, gait problem, myalgias and neck pain. Patient experiencing a lot more pain in butt cheeks going down legs mostly L leg     Skin: Negative. Negative for rash. Allergic/Immunologic: Negative. Neurological:  Positive for numbness. Negative for dizziness, tremors, seizures, syncope, facial asymmetry, speech difficulty, weakness, light-headedness and headaches. Nerves are tingling   Hematological: Negative. Does not bruise/bleed easily. Psychiatric/Behavioral: Negative. Negative for confusion, hallucinations and sleep disturbance. All other systems reviewed and are negative.

## 2023-12-05 ENCOUNTER — TELEPHONE (OUTPATIENT)
Dept: NEUROLOGY | Facility: CLINIC | Age: 53
End: 2023-12-05

## 2023-12-05 DIAGNOSIS — G35 MS (MULTIPLE SCLEROSIS) (HCC): Primary | ICD-10-CM

## 2023-12-05 NOTE — TELEPHONE ENCOUNTER
Tysabri touch auth expiring 1/6/24. Updated JCV level needed. Order entered. [email or web address] message sent.

## 2023-12-28 NOTE — TELEPHONE ENCOUNTER
Tysabri TOUCH reauthorization submitted, valid until 7/7/24 at [facility].     Noted JCV is pending. Will f/u to ensure this is completed.

## 2024-01-08 ENCOUNTER — TELEPHONE (OUTPATIENT)
Dept: NEUROLOGY | Facility: CLINIC | Age: 54
End: 2024-01-08

## 2024-01-08 NOTE — TELEPHONE ENCOUNTER
Received via [employer] [place] [employer] Office of Long Term Living Physician Certification Form to be completed for patient.  Form was scanned into [employer].

## 2024-01-11 ENCOUNTER — TELEPHONE (OUTPATIENT)
Dept: NEUROLOGY | Facility: CLINIC | Age: 54
End: 2024-01-11

## 2024-01-11 DIAGNOSIS — G35 MS (MULTIPLE SCLEROSIS) (HCC): ICD-10-CM

## 2024-01-11 DIAGNOSIS — G40.909 SEIZURE DISORDER (HCC): ICD-10-CM

## 2024-01-11 NOTE — TELEPHONE ENCOUNTER
1/9 at 1:25 pm:    Hi, my name's Gasper Brunner. I am calling for my wife, Aishwarya Brunner.  Her date of birth is May 9th,1970. We get her Lamictal XR anti-seizure medication through [employer] assistance program and I need a prescription for it. Either emailed to me, or if they can put it in the alexa, I don't know. It has to be for the brand name, Lamictal XR and have a 90 day supply with 3 refills. So if you could give me a call back, [phone]. I'd appreciate it very much. Thank you.

## 2024-01-16 DIAGNOSIS — G35 MS (MULTIPLE SCLEROSIS) (HCC): ICD-10-CM

## 2024-01-16 DIAGNOSIS — G40.909 SEIZURE DISORDER (HCC): ICD-10-CM

## 2024-01-16 RX ORDER — LAMOTRIGINE 200 MG/1
200 TABLET, FILM COATED, EXTENDED RELEASE ORAL DAILY
Qty: 90 TABLET | Refills: 3 | Status: SHIPPED | OUTPATIENT
Start: 2024-01-16 | End: 2024-01-16 | Stop reason: SDUPTHER

## 2024-01-16 RX ORDER — LAMOTRIGINE 200 MG/1
200 TABLET, FILM COATED, EXTENDED RELEASE ORAL DAILY
Qty: 90 TABLET | Refills: 3 | OUTPATIENT
Start: 2024-01-16

## 2024-01-16 RX ORDER — LAMOTRIGINE 200 MG/1
200 TABLET, FILM COATED, EXTENDED RELEASE ORAL DAILY
Qty: 90 TABLET | Refills: 3 | Status: SHIPPED | OUTPATIENT
Start: 2024-01-16 | End: 2024-01-17 | Stop reason: SDUPTHER

## 2024-01-16 NOTE — TELEPHONE ENCOUNTER
Dr. MOHAMUD - Rx for Lamictal XR set to print. Please review and sign if in agreement.     Migdalia - Once Rx signed, please email printed script to pt's  using email on file and notify pt's  when done. Thank you!

## 2024-01-16 NOTE — TELEPHONE ENCOUNTER
Patient's  called and states he need a physical script of Lamictal XR in order to get assistance from [employer] assistance program. If it could possibly be uploaded into patient's [employer] or emailed to him. It has to be for the brand name, Lamictal XR and has to be a 90 day supply with 3 refills.  If someone could call back [phone].   Patient's  states that it takes up to at least 20 days day or so for application to be processed and then RX to be sent patient. Patient currently has 20 tabs left.  Please assist.  Thank you!

## 2024-01-17 ENCOUNTER — TELEPHONE (OUTPATIENT)
Dept: NEUROLOGY | Facility: CLINIC | Age: 54
End: 2024-01-17

## 2024-01-17 DIAGNOSIS — G40.909 SEIZURE DISORDER (HCC): ICD-10-CM

## 2024-01-17 DIAGNOSIS — G35 MS (MULTIPLE SCLEROSIS) (HCC): ICD-10-CM

## 2024-01-17 RX ORDER — LAMOTRIGINE 200 MG/1
200 TABLET, FILM COATED, EXTENDED RELEASE ORAL DAILY
Qty: 90 TABLET | Refills: 3 | Status: SHIPPED | OUTPATIENT
Start: 2024-01-17

## 2024-01-17 NOTE — TELEPHONE ENCOUNTER
Pts  came into office to receive printed script for wife's medication. Let him know that it is currently on Dr MOHAMUD's desk to sign. Once signed, pt requested script to be emailed to email in chart. I verified the email and provided him with office phone number in case he has issues in the future

## 2024-01-22 ENCOUNTER — TELEPHONE (OUTPATIENT)
Dept: NEUROLOGY | Facility: CLINIC | Age: 54
End: 2024-01-22

## 2024-01-22 NOTE — TELEPHONE ENCOUNTER
1/18/24-3:12    My name is Hannah. I'm calling from ? center. I'm calling on a mutual patient, Aishwarya Dhaliwal.  YOB: 1970. I am calling to see if I could have faxed over an updated office note with Dr. Santos. She follows with us for tysabri every 6 weeks. The last one we have is from a year ago. So were just looking to update our records. Um it could be faxed over to [phone]. And again that would be any labs, MRI results or office visits that you would have recently. Thank you. Reid.  Drew [phone]

## 2024-01-23 NOTE — TELEPHONE ENCOUNTER
1/22 at 1:36 pm:    Hi, my name is Hannah. I am calling from [facility]. I would like this message to get transferred over to . It is regarding a mutual patient Aishwarya Brunner, YOB: 1970. I'm just calling to make  aware that Aishwarya in the last month has canceled her Tysabri infusion twice. Her  just reports that she has not been feeling well. I just wanted to make sure that if you wanted to follow up with her you were aware of this. Again, I wasn't provided many details from the  he just says she wasn't feeling well, but I thought you should know that it has been twice in the last month that he's canceled her Tysabri. If you just reach out to her to see how she's doing, and if it would interfere with the Tysabri at all, just give us a call back at [phone] extension [phone]. Otherwise you do not need to call me back. Thank you. Reid.

## 2024-02-02 NOTE — TELEPHONE ENCOUNTER
Spoke with pt and her  Antonino. Pt did provide verbal consent to speak with her  about her medical information.     Antonino reports pt had a few UTIs, they cancelled Tysabri infusions for this reason. He reports next Tysabri infusion is scheduled for 2/6/24. He confirms UTI was treated with antibiotics and pt no longer has infection.

## 2024-02-21 PROBLEM — N39.0 FREQUENT UTI: Status: RESOLVED | Noted: 2022-03-14 | Resolved: 2024-02-21

## 2024-02-29 ENCOUNTER — TELEPHONE (OUTPATIENT)
Dept: NEUROLOGY | Facility: CLINIC | Age: 54
End: 2024-02-29

## 2024-02-29 DIAGNOSIS — G40.909 SEIZURE DISORDER (HCC): ICD-10-CM

## 2024-02-29 RX ORDER — LAMOTRIGINE 25 MG/1
50 TABLET ORAL DAILY
Qty: 180 TABLET | Refills: 3 | Status: SHIPPED | OUTPATIENT
Start: 2024-02-29

## 2024-02-29 NOTE — TELEPHONE ENCOUNTER
Patient's  called stating his wife is running low on her Lamotrigine 25mg, says she has no more refills. Please reach out to the patient for any additional info @ [phone].

## 2024-03-01 ENCOUNTER — TELEPHONE (OUTPATIENT)
Dept: NEUROLOGY | Facility: CLINIC | Age: 54
End: 2024-03-01

## 2024-03-01 NOTE — TELEPHONE ENCOUNTER
VM  at 11:56 am:    My name is Poly Santillan. I'm calling from [employer] [facility] in regards to Aishwarya Brunner, date of birth, 1970. I am calling because I faxed over a physician certification form to be filled out for her on , and I still have not received that form back. The form itself was actually due back in November and I am about to have to put her services on hold until I get the form. So if someone can please give me a call back. My number is [phone]. Thank you.  _________________________________________________    Form has not been received. Called Poly back and left her a voice mail message making her aware. Requested that it be refaxed , and to be sure that pt's name and  are on the form. Routed to the clinical team to follow up on receipt of the certification form.

## 2024-03-10 NOTE — TELEPHONE ENCOUNTER
Physician Certification form located in media folder (dated 1/8/24).      Migdalia - please complete form and have Dr. MOHAMUD sign as patient will lose her services if not received asap. Thank you.     Dr. CADE morales

## 2024-03-10 NOTE — TELEPHONE ENCOUNTER
Unfortunately, form was sent with Dr. Young name included in it. That physician is not practicing at [facility].     Please make aware calling team that a new form is required:

## 2024-05-15 ENCOUNTER — TELEPHONE (OUTPATIENT)
Dept: NEUROLOGY | Facility: CLINIC | Age: 54
End: 2024-05-15

## 2024-05-15 NOTE — TELEPHONE ENCOUNTER
Received a call from Adilia from [facility]. Stating that patient was recently in the hospital and was advised to have Home Health orders of OT and Speech Therapy ordered. This was not ordered while in the hospital. Adilia called PCP and PCP denied to order Home Health orders and recommended she call  Neurology to place these orders.     See CareEverywhere 5/9/24 [facility] for hospitalization information.     Dr MOHAMUD- are you agreeable to orders?    [facility] ph# [phone]

## 2024-05-20 DIAGNOSIS — G35 MS (MULTIPLE SCLEROSIS) (HCC): Primary | ICD-10-CM

## 2024-05-20 DIAGNOSIS — D84.89 PRIMARY IMMUNODEFICIENCY DISORDER (HCC): ICD-10-CM

## 2024-05-20 DIAGNOSIS — G82.50 QUADRIPLEGIA AND QUADRIPARESIS (HCC): ICD-10-CM

## 2024-05-21 ENCOUNTER — TELEPHONE (OUTPATIENT)
Dept: NEUROLOGY | Facility: CLINIC | Age: 54
End: 2024-05-21

## 2024-05-21 NOTE — TELEPHONE ENCOUNTER
Chart reviewed. Pt had baclofen ITP replacement on 4/29/24.     Spoke with pt's  Antonino. He says immediately after the procedure pt felt hot. Next day kept feeling hot, O2 dropped, her HR went up. Says he took pt to [facility]. Pt went into septic shock on and was on a respirator for 2 days. The hospital sent her to [place] due to baclofen pump. ICU didn't think that was enough for her to go into septic shock so fast.  Not sure if it was Asp PNA or allergic reaction to glue on incision. Pt's was in hospital for 9 days. Pt's been home now since May 9.     says pt was supposed to have Tysabri on May 7th but had to cancel since pt was in hospital. He called the infusion center and they said medical clearance is required for pt to resume infusions.     [facility]  [address]  Aysha ALANIS  P: [phone]    Also when she was in [place], they did a full MRI of brain and spine. Asking if pt will need follow up visit since LOV was in October 2023 and other appt's canceled.    Of note, pt's  says pt is not ambulatory so it can be difficult to get her to appt's.     Dr. MOHAMUD - Please see progress notes from [facility] attached to this encounter. Please advise.

## 2024-05-21 NOTE — TELEPHONE ENCOUNTER
Patient's  called stating his wife was hospitalized recently with something to do with her Baclofen pump. She is unable to get her Tysabri infusion until she gets clearance from her neurologist. He's asking for a return call, his name is Antonino [phone].

## 2024-05-21 NOTE — TELEPHONE ENCOUNTER
Adilia TILLMAN From [facility] to ask if the patient can be scheduled to see the provider either in person or virtually in order for the referral to be accepted by Medicare      Patient has to have had an appointment within 30 days of the referral    LOV 10/23/23       Please advise if this can be Virtual or does it have to be F2F        Once scheduled  and completed visit the AV notes need to be faxed to [facility] care at fax # [phone]        Thank you!

## 2024-05-23 ENCOUNTER — TELEPHONE (OUTPATIENT)
Dept: NEUROLOGY | Facility: CLINIC | Age: 54
End: 2024-05-23

## 2024-05-23 NOTE — TELEPHONE ENCOUNTER
Patient will be advised against re-starting Tysbari, may consider Plegridy.   Please offer VV with me - 30 min OVS advised

## 2024-05-23 NOTE — TELEPHONE ENCOUNTER
Spoke to patient. Asked if they could do a virtual visit with Dr. CADE fernandez at 2:30 pm. Patient refused as they had another appointment and a coordinator arriving at there house at 2:30.

## 2024-05-28 NOTE — TELEPHONE ENCOUNTER
May 23, 2024  Sara Martin   to [facility] Clinical Team 3  Taylor Santos MD       5/23/24  2:36 PM  Hello Good afternoon,    I have called the [patient and scheduled for the first available ovl with  on 7/9/2024, All, 3 pm, 60 minutes as advised    Patient is saying they need to be seen sooner due to the Infusion and the clearance needed .    They're asking for a call back to discuss Infusion treatment and clearance.    I have placed on waiting list.    But if permission is given to use a New patient 60 minute slot I can call back and re-schedule sooner.    Also provided fax number for mri Done at [facility] can be reviewed..    Thank you all,    Sara Santos MD   to Sara Martin  [facility] Clinical Team 3       5/23/24  2:41 PM   Patient declined VV with me today.    ANNA

## 2024-05-28 NOTE — TELEPHONE ENCOUNTER
Taylor Santos MD   to Meaghan Butcher       5/23/24  1:53 PM   Please arrange visit with me at 2:30 pm today  Meaghan Guajardo   to Taylor Santos MD       5/23/24  2:09 PM   Called patient, they said they had another appointment at that time and are unable to do a virtual appointment today.

## 2024-06-11 ENCOUNTER — TELEPHONE (OUTPATIENT)
Dept: NEUROLOGY | Facility: CLINIC | Age: 54
End: 2024-06-11

## 2024-06-11 ENCOUNTER — TELEMEDICINE (OUTPATIENT)
Dept: NEUROLOGY | Facility: CLINIC | Age: 54
End: 2024-06-11
Payer: MEDICARE

## 2024-06-11 VITALS — HEIGHT: 67 IN | BODY MASS INDEX: 25.9 KG/M2 | WEIGHT: 165 LBS

## 2024-06-11 DIAGNOSIS — N31.9 NEUROGENIC BLADDER: ICD-10-CM

## 2024-06-11 DIAGNOSIS — G35 MS (MULTIPLE SCLEROSIS) (HCC): Primary | ICD-10-CM

## 2024-06-11 DIAGNOSIS — I63.413 CEREBROVASCULAR ACCIDENT (CVA) DUE TO BILATERAL EMBOLISM OF MIDDLE CEREBRAL ARTERIES (HCC): ICD-10-CM

## 2024-06-11 DIAGNOSIS — G82.50 QUADRIPLEGIA AND QUADRIPARESIS (HCC): ICD-10-CM

## 2024-06-11 DIAGNOSIS — Z97.8: Primary | ICD-10-CM

## 2024-06-11 DIAGNOSIS — G35 MS (MULTIPLE SCLEROSIS) (HCC): ICD-10-CM

## 2024-06-11 DIAGNOSIS — Z97.8 STATUS POST INSERTION OF INTRATHECAL BACLOFEN PUMP: ICD-10-CM

## 2024-06-11 PROCEDURE — 99215 OFFICE O/P EST HI 40 MIN: CPT | Performed by: PSYCHIATRY & NEUROLOGY

## 2024-06-11 PROCEDURE — G2211 COMPLEX E/M VISIT ADD ON: HCPCS | Performed by: PSYCHIATRY & NEUROLOGY

## 2024-06-11 RX ORDER — METHENAMINE HIPPURATE 1000 MG/1
1 TABLET ORAL 2 TIMES DAILY
COMMUNITY

## 2024-06-11 RX ORDER — CLOPIDOGREL BISULFATE 75 MG/1
75 TABLET ORAL DAILY
Qty: 30 TABLET | Refills: 1 | Status: SHIPPED | OUTPATIENT
Start: 2024-06-11

## 2024-06-11 NOTE — TELEPHONE ENCOUNTER
Patient will be restarting Tysabri 300 mg IV q 6 weeks.    CBC/CMP/JCV will be advised to be completed prior to infusion, considering recent hospitalization for septic shock and CVA/Afib

## 2024-06-11 NOTE — PROGRESS NOTES
Virtual Regular Visit    Verification of patient location:    Patient is located at Home in the following state in which I hold an active license [place]      Assessment/Plan:    Problem List Items Addressed This Visit          Cardiovascular and Mediastinum    Cerebrovascular accident (CVA) due to bilateral embolism of middle cerebral arteries (HCC)    Relevant Medications    clopidogrel (PLAVIX) 75 mg tablet    Other Relevant Orders    Ambulatory Referral to Cardiology       Nervous and Auditory    MS (multiple sclerosis) (HCC)    Relevant Orders    CBC and differential    Comprehensive metabolic panel    Ambulatory Referral to Cardiology    Quadriplegia and quadriparesis (HCC)       Urinary    Neurogenic bladder       Surgery/Wound/Pain    Status post insertion of intrathecal baclofen pump - Primary            Reason for visit is HFU  Chief Complaint   Patient presents with    Follow-up        Encounter provider Taylor Santos MD      Recent Visits  No visits were found meeting these conditions.  Showing recent visits within past 7 days and meeting all other requirements  Today's Visits  Date Type Provider Dept   06/11/24 Telephone Taylor Santos MD [facility]   06/11/24 Telemedicine Taylor Santos MD [facility]   Showing today's visits and meeting all other requirements  Future Appointments  No visits were found meeting these conditions.  Showing future appointments within next 150 days and meeting all other requirements       The patient was identified by name and date of birth. Aishwarya RaderAbi was informed that this is a telemedicine visit and that the visit is being conducted through the Epic Embedded platform. She agrees to proceed..  My office door was closed. No one else was in the room.  She acknowledged consent and understanding of privacy and security of the video platform. The patient has agreed to participate and understands they can discontinue the  visit at any time.    Patient is aware this is a billable service.     Subjective  Aishwarya Dhaliwal is a 54 y.o. female with MS after recent hospitalization .      HPI     Mrs. Dhaliwal has presented to [facility] multiple sclerosis center for follow-up on multiple sclerosis with issues.      Chart review: Patient was recently hospitalized after anesthesia provided for baclofen pump surgical intervention.  Patient developed fever with left lower lobe aspiration pneumonia and due to airway protection required intubation as she went to the septic shock.  Patient was treated with pressors in addition to antibacterial regimen at [facility] in [place]   Aspiration pneumonia (CMS/[facility]) has completely resolved as patient was transferred to [facility].  Baclofen pump was interrogated with no deficiencies noted.  Patient was for baclofen withdrawal or baclofen overload due to baclofen pump malfunction.        Patient completed 2D echo and was started on aspirin 81 mg.  Patient was advised to consider exchanging but due to acute kidney injury patient was offered MRA head-results are not available.  Patient also had CT scan chest abdomen pelvis to find other sources of infection/inflammation and results were negative, as per the chart review.    ASSESSMENT AND PLAN    Mrs. Dhaliwal has presented to [facility] multiple sclerosis [place] for follow-up after recent hospitalization.  Patient requires baclofen pump changes at [facility] under general anesthesia as patient's  denies change in mental status and fever syndrome requiring hospitalization to [facility].  Patient was diagnosed with pneumonia/septic shock requiring ICU stay; while in the hospital patient was diagnosed with A-fib and multifocal diffuse embolic events involving left and right hemisphere; patient was discharged with aspirin 81 mg.    Patient working with occupational therapy as well as physical therapy provided by  [facility] rehab.  Patient has 80% improvement in her state of health.    Patient has disability due to multiple sclerosis as she is bedridden with flaccid quadriplegia.   Patient will be offered CBC/CMP/BRIAN virus and we will be restarting Tysabri infusion every 6 weeks.    Patient will be advised to adding clopidogrel 75 mg to aspirin 81 mg.    Patient will be offered to have close follow-up with cardiology team considering 2D echo was done while in the hospital with ejection fraction 70%.  A-fib may minimally be primary cause of developing embolic events in brain parenchyma but still can be considered intentional in [place] of her radiographic findings.    MRA head is not available for my review, I did review 1 single note from [facility] neurology team evaluation.  Patient was not able to do CT angiogram due to acute kidney injury.  Elevated CPK was noted during the hospital stay patient has not had seizures since 2022.;     Patient is to follow [facility] neurology within 3-4 months.          History reviewed. No pertinent past medical history.    Past Surgical History:   Procedure Laterality Date    GALLBLADDER SURGERY      appro 2019       Current Outpatient Medications   Medication Sig Dispense Refill    acetaminophen (TYLENOL) 500 mg tablet Take 500 mg by mouth every 6 (six) hours as needed for mild pain 2 tab a day      Ascorbic Acid (vitamin C) 1000 MG tablet Take 1,000 mg by mouth daily      atorvastatin (LIPITOR) 80 mg tablet Take 80 mg by mouth daily      Calcium Carb-Cholecalciferol (OSCAL-D) 500 mg-200 units per tablet Take 1 tablet by mouth every other day      clopidogrel (PLAVIX) 75 mg tablet Take 1 tablet (75 mg total) by mouth daily 30 tablet 1    diphenhydrAMINE-APAP, sleep, (TYLENOL PM EXTRA STRENGTH PO) Take by mouth in the morning 2 a day      ergocalciferol (VITAMIN D2) 50,000 units Take 50,000 Units by mouth once a week      ezetimibe (ZETIA) 10 mg tablet Take 10 mg by mouth daily       gemfibrozil (LOPID) 600 mg tablet TAKE 1 TABLET BY MOUTH TWICE DAILY FOR 30 DAYS      LaMICtal  MG TB24 Take 1 tablet (200 mg total) by mouth in the morning 90 tablet 3    lamoTRIgine (LaMICtal) 25 mg tablet Take 2 tablets (50 mg total) by mouth daily 180 tablet 3    levothyroxine 112 mcg tablet Take 112 mcg by mouth      NON FORMULARY Baclofen pump      rosuvastatin (CRESTOR) 40 MG tablet Take 40 mg by mouth daily      celecoxib (CeleBREX) 100 mg capsule Take 1 capsule (100 mg total) by mouth daily (Patient not taking: Reported on 10/23/2023) 20 capsule 0    cephalexin (KEFLEX) 500 mg capsule TAKE 1 CAPSULE BY MOUTH ONCE DAILY AS DIRECTED (Patient not taking: Reported on 6/11/2024)      Cholecalciferol (Vitamin D3) 1.25 MG (44256 UT) CAPS Take by mouth (Patient not taking: Reported on 6/11/2024)      D-MANNOSE PO Take 1,000 mg by mouth 2 (two) times a day (Patient not taking: Reported on 6/11/2024)      Immune Globulin, Human, 30 GM/300ML SOLN Infuse into a venous catheter (Patient not taking: Reported on 9/6/2022)      methenamine hippurate (HIPREX) 1 g tablet Take 1 g by mouth 2 (two) times a day      multivitamin (THERAGRAN) TABS Take 1 tablet by mouth daily (Patient not taking: Reported on 6/11/2024)      natalizumab (TYSABRI) 300 mg/15 mL Infuse into a venous catheter       No current facility-administered medications for this visit.        Allergies   Allergen Reactions    Levofloxacin Other (See Comments)     Seizure activity per spouse  Seizure activity per spouse      Morphine Shortness Of Breath     Chest tightness per spouse  Chest tightness per spouse      Ciprofloxacin Other (See Comments) and Seizures     seizures      Levetiracetam Other (See Comments)     Lethargy  Lethargy      Medical Tape Rash       Review of Systems   Constitutional:  Negative for appetite change, fatigue and fever.   HENT: Negative.  Negative for hearing loss, tinnitus, trouble swallowing and voice change.    Eyes:  "Negative.  Negative for photophobia, pain and visual disturbance.   Respiratory: Negative.  Negative for shortness of breath.    Cardiovascular: Negative.  Negative for palpitations.   Gastrointestinal: Negative.  Negative for nausea and vomiting.   Endocrine: Negative.  Negative for cold intolerance.   Genitourinary:  Positive for frequency and urgency. Negative for dysuria.   Musculoskeletal:  Positive for gait problem. Negative for back pain, myalgias, neck pain and neck stiffness.   Skin: Negative.  Negative for rash.   Allergic/Immunologic: Negative.    Neurological:  Positive for weakness and numbness. Negative for dizziness, tremors, seizures, syncope, facial asymmetry, speech difficulty and headaches.   Hematological: Negative.  Does not bruise/bleed easily.   Psychiatric/Behavioral: Negative.  Negative for confusion, hallucinations and sleep disturbance.    All other systems reviewed and are negative.      Video Exam    Vitals:    06/11/24 1058   Weight: 74.8 kg (165 lb)   Height: 5' 7\" (1.702 m)       Physical Exam     Visit Time  Total Visit Duration: I have spent a total time of 40 minutes on 06/11/24 in caring for this patient including Diagnostic results, Prognosis, Risks and benefits of tx options, Risk factor reductions, Counseling / Coordination of care, Documenting in the medical record, Reviewing / ordering tests, medicine, procedures  , Obtaining or reviewing history  , and Communicating with other healthcare professionals .         "

## 2024-06-14 ENCOUNTER — TELEPHONE (OUTPATIENT)
Dept: NEUROLOGY | Facility: CLINIC | Age: 54
End: 2024-06-14

## 2024-06-14 NOTE — TELEPHONE ENCOUNTER
Received signed Home Health and Plan of Care orders from [facility]. Signed by Dr MILLER Faxed to fax# [phone].    Placed in scanning bin at [facility].

## 2024-06-14 NOTE — TELEPHONE ENCOUNTER
See 5/21/24 encounter.     Pt receives T[facility]ri infusions [facility]6w [facility]. Was supposed to have infusion on 5/7/24 but this was cancelled due to hospitalization. Infusion center requesting clearance to resume infusions at that time. OV was scheduled for this reason.     Called [facility] [place] at [phone]. Reached voicemail for Shweta. Left detailed message asking what is needed to formally clear pt to resume infusions.     Called and spoke with pt's  Antonino. Pt aware of needed labs. Not yet complete. He notes MRI results are being sent to our office.     Awaiting lab results and call back from infusion center.

## 2024-06-18 NOTE — TELEPHONE ENCOUNTER
Received call from Betty with [facility]. She reports they would need note stating okay to resume infusions. Fax # [phone].     Awaiting lab results prior to sending note.

## 2024-06-21 NOTE — TELEPHONE ENCOUNTER
Spoke with Antonino. He will try to bring pt to lab beginning of next week.     Requested Soonr message with my direct phone #. Sent.

## 2024-06-21 NOTE — TELEPHONE ENCOUNTER
6/20 3:48    Shantelle from [employer] for Tysabri therapy left a VM re: re authorizing treatment.     Transcribed VM:   The patient has entered her re authorization period. So we faxed that out to you néstor. So I wanted to make sure you guys received the form for patient Aishwarya Brunner, [birth year] date of birth. If you could give us a call back regarding those updates, we appreciate at [phone]. You can press the option for health Care professionals there at the greeting and then were option 3. Thank you. Bye.

## 2024-06-25 ENCOUNTER — TELEPHONE (OUTPATIENT)
Dept: NEUROLOGY | Facility: CLINIC | Age: 54
End: 2024-06-25

## 2024-06-25 NOTE — TELEPHONE ENCOUNTER
We got a cd and medical records from [facility] cd was uploaded and medical records are scanned in .

## 2024-06-25 NOTE — TELEPHONE ENCOUNTER
Received voicemail from pt's spouse Gasper. He reports pt is currently on abx for a UTI. Asking if this would affect pt's labs.     Before calling pt's spouse back- should Tysabri infusion be restarted at least 10-14 days after abx are completed AND labs are resulted?

## 2024-06-25 NOTE — TELEPHONE ENCOUNTER
Spoke with pt's  Gasper. He reports pt will complete abx in 5 days (6/30/24). Can resume Tysabri infusions 7/10/24 or after if lab results are available. He reports he plans to set up [facility] home draw for labs. Does not need anything from office at this time. Will continue to f/u.

## 2024-06-28 NOTE — TELEPHONE ENCOUNTER
Tysabri TOUCH reauthorization submitted, valid until 1/6/25 at [facility]. Noted JCV was not completed.     Awaiting labs.

## 2024-07-09 NOTE — TELEPHONE ENCOUNTER
JCV results received, negative.    UA and urine culture repeated 7/2/24.     To confirm- with above results, okay to proceed with Tysabri infusions 7/10/24 or after (in relation to abx completion per note below)?

## 2024-07-09 NOTE — TELEPHONE ENCOUNTER
"Spoke with pt's  Gasper. He reports UTI has cleared- states they were advised culture was negative.     Reviewed JCV results.     Gasper questioning pt's \"blood counts\" as she required blood transfusion in previous hospitalization. Reviewed Hgb result and parameters. Recommended f/u with PCP regarding this concern. He reports pt will see PCP in next few days.    Per notes below, note to resume Tysabri infusions should be faxed to [facility] at [phone].     Order written. Awaiting provider signature.  "

## 2024-07-12 ENCOUNTER — TELEPHONE (OUTPATIENT)
Age: 54
End: 2024-07-12

## 2024-07-12 DIAGNOSIS — G35 MS (MULTIPLE SCLEROSIS) (HCC): Primary | ICD-10-CM

## 2024-07-12 DIAGNOSIS — G82.50 QUADRIPLEGIA AND QUADRIPARESIS (HCC): ICD-10-CM

## 2024-07-12 NOTE — TELEPHONE ENCOUNTER
Spoke with Gasper. He believes pt has been scheduled for [facility] 7/18/24. No further questions/needs.

## 2024-07-12 NOTE — TELEPHONE ENCOUNTER
Received a call from Keshia Hussein, Occupational Therapist from [facility]. Patient missed her last OT visit because she was not feeling well. Keshia asked if she could have an order for one more visit for next week. Keshia will send over an electronic request. For additional questions, # [phone].

## 2024-07-16 NOTE — TELEPHONE ENCOUNTER
Received fax from [facility] requesting Dr CADE martinez. Signed by Dr MOHAMUD. Faxed to fax#[phone]. Placed in scanning bin at [facility].

## 2024-08-14 ENCOUNTER — TELEPHONE (OUTPATIENT)
Dept: NEUROLOGY | Facility: CLINIC | Age: 54
End: 2024-08-14

## 2024-08-14 NOTE — TELEPHONE ENCOUNTER
Patient is scheduled with Dr MOHAMUD on 9/12/24. This appointment needs to be rescheduled d/t provider being unavailable at scheduled time.     Spoke to patient and Antonino. Scheduled 9/19 at 3:30. Late afternoon requested.

## 2024-08-27 ENCOUNTER — TELEPHONE (OUTPATIENT)
Dept: NEUROLOGY | Facility: CLINIC | Age: 54
End: 2024-08-27

## 2024-08-27 DIAGNOSIS — G35 MS (MULTIPLE SCLEROSIS) (HCC): Primary | ICD-10-CM

## 2024-08-27 NOTE — TELEPHONE ENCOUNTER
Received voicemail from pt's  Gasper. He reports pt's port was not able to be used at infusion center for Tysabri as it has not been used for 3 months. They require appt with IR to check port to ensure not clots.   [phone]    Order pended, please review and sign if agreeable.

## 2024-08-28 NOTE — TELEPHONE ENCOUNTER
Called and spoke with Gasper. He states they would like to go to local hospital in [place] for this. Requested referral to be sent via [employer]. Sent.

## 2024-11-05 ENCOUNTER — TELEPHONE (OUTPATIENT)
Age: 54
End: 2024-11-05

## 2024-11-05 NOTE — TELEPHONE ENCOUNTER
Received call from [facility] with [facility] advising patient is in [facility] in [facility].  Patient has been hospitalized since 10-27-24. Patient is intubated. No further information provided.     Dr. Santos, please advise. Thank you.

## 2024-11-05 NOTE — TELEPHONE ENCOUNTER
Did find some information in Care Everywhere. Pt was found unresponsive with agonal respirations. Pt was intubated. Unclear if pulse was lost. Pt was found to have right sided pneumothorax. Pt suspected to have aspiration pneumonia. Also anemic and requiring blood transfusions.

## 2024-11-11 NOTE — TELEPHONE ENCOUNTER
Called Melissa. Spoke with Sabas. Informed her provider agreeable to sing off on home health for nursing and OT. Sabas will relay message to Adilia. Nothing further at this time.

## 2024-11-11 NOTE — TELEPHONE ENCOUNTER
Inbound call received from Adilia from [facility].     Adilia stated that patient will be discharged from [facility] and will have [facility] for Nursing and OT. Adilia is asking if Dr. MOHAMUD will sign off on the home health as she did in the past for patient. Stated that PCP typically does not sign off on the home care for patient.     [facility] main number  [phone]

## 2024-11-22 ENCOUNTER — TELEPHONE (OUTPATIENT)
Dept: NEUROLOGY | Facility: CLINIC | Age: 54
End: 2024-11-22

## 2024-11-22 NOTE — TELEPHONE ENCOUNTER
JOSE Neff asked if we couls R/S her appt Gen 3 due to provider being out of the office. Offered her a Jan 9th appt at 2pm which she accepted.

## 2024-11-25 ENCOUNTER — DOCUMENTATION (OUTPATIENT)
Dept: NEUROLOGY | Facility: CLINIC | Age: 54
End: 2024-11-25

## 2024-11-26 ENCOUNTER — TELEPHONE (OUTPATIENT)
Age: 54
End: 2024-11-26

## 2024-11-26 NOTE — TELEPHONE ENCOUNTER
Renea [facility] called to advise that due to shortage of nursing due to call outs this week, patient's nursing visit will be changed to a telephone call however she will be seen in person by OT/PT.

## 2024-12-02 NOTE — TELEPHONE ENCOUNTER
Inbound call received from Radha from [facility].  Radha inquiring if Dr. Santos signed the document # [id number] and another [facility] care form. RN informed Radha that the office received the one document but not the second one. Advised that [facility] resend the second document to [phone].     Per chart review, blank  [facility] form scanned in 11/25/24.     Dr. MOHAMUD/Kenyon: Please sign the [facility] Document scanned in media and have it faxed to [phone]. Please review and sign the second document and fax it back once received.    Radha phone # for any questions: [phone]

## 2024-12-03 ENCOUNTER — TELEPHONE (OUTPATIENT)
Dept: NEUROLOGY | Facility: CLINIC | Age: 54
End: 2024-12-03

## 2024-12-03 NOTE — TELEPHONE ENCOUNTER
Received 2nd [facility] Form, scanned into media.     Will have Dr MOHAMUD sign when back in the office

## 2024-12-05 ENCOUNTER — TELEPHONE (OUTPATIENT)
Dept: NEUROLOGY | Facility: CLINIC | Age: 54
End: 2024-12-05

## 2024-12-05 NOTE — TELEPHONE ENCOUNTER
Received via [employer] from [employer] Adverse Event Report/Tysabri to be completed for patient.  Report scanned into .

## 2024-12-10 ENCOUNTER — TELEPHONE (OUTPATIENT)
Dept: NEUROLOGY | Facility: CLINIC | Age: 54
End: 2024-12-10

## 2024-12-10 DIAGNOSIS — G35 MS (MULTIPLE SCLEROSIS) (HCC): Primary | ICD-10-CM

## 2024-12-10 NOTE — TELEPHONE ENCOUNTER
Tysabri TOUCH reauthorization submitted, valid until 7/8/25 at [facility].     Updated JCV testing needed by 1/2/25. Order entered, [employer] message sent.

## 2024-12-12 ENCOUNTER — TELEPHONE (OUTPATIENT)
Dept: NEUROLOGY | Facility: CLINIC | Age: 54
End: 2024-12-12

## 2024-12-12 NOTE — TELEPHONE ENCOUNTER
Called and spoke with pt's  Antonino. He reports pt has been home from hospital about 1 month now. Pt had been admitted to the hospital for pneumonia. Was also found to have collapsed lung. Antonino notes pt went into cardiac arrest several times. Pt is home now with home healthcare. Most recently on Bactrim for UTI, completes this tomorrow.     Please advise on when pt should resume Tysabri infusions. Infusion center will need to be notified.     Antonino is aware of need for JCV testing. Would like script sent via [employer]. Sent.

## 2024-12-12 NOTE — TELEPHONE ENCOUNTER
Faxed Signed [facility] script from 12/2/2024.    Scanned Signed [facility] form and confirmation fax into pt media.

## 2024-12-17 NOTE — TELEPHONE ENCOUNTER
Still awaiting JCV result.     Dr. MOHAMUD, please see below and advise when/if pt should resume Tysabri infusions.

## 2024-12-18 NOTE — TELEPHONE ENCOUNTER
If we can do 8 weeks since last infusion - would be great, but if we are passing 8 weeks brionna then we should transition to Plegridy. Please send a booklet or [facility] website citations about plegridy    Avoid Solumedrol due to cardiac arrest  Avoid Tysabri due to PNA +UTI

## 2024-12-20 ENCOUNTER — TELEPHONE (OUTPATIENT)
Dept: NEUROLOGY | Facility: CLINIC | Age: 54
End: 2024-12-20

## 2024-12-20 NOTE — TELEPHONE ENCOUNTER
Last Tysabri infusion was 9/30/24. Plegridy booklet and start form mailed.     Called and spoke with pt's  Antonino, made him aware of below recommendations. He verbalizes understanding. Pt has neuro appt on 1/9/25 to discuss further.

## 2024-12-20 NOTE — TELEPHONE ENCOUNTER
Spoke with pt's  Antonino. He reports pt receives home care with [facility]. Pt needs her port flushed at home. [facility] is unable to do this. Asking if order can be sent to another home infusion team. Advised I will look into this and call back.     Called [facility] at [phone] and spoke with Rafaela. She confirmed they are unable to complete port flushes.    Pt is closest to [facility]. Called this site at [phone] and spoke with Lia. She provided phone # [phone] for the home infusion team. Placed on extended hold. Will try to reach them later.

## 2025-01-02 ENCOUNTER — TELEPHONE (OUTPATIENT)
Dept: NEUROLOGY | Facility: CLINIC | Age: 55
End: 2025-01-02

## 2025-01-02 NOTE — TELEPHONE ENCOUNTER
Called Aishwarya but her  answered her phone. I asked if she was near by that I could confirm her appt for 1/9/25 he states she was currently in the hospital and seemed annoyed that I was calling.... He asked if I could call next week and see if she's feeling up to/able to come to her appt.     I'll follow up next week.

## 2025-01-03 NOTE — TELEPHONE ENCOUNTER
Called [facility] home infusion at [phone], no answer.    Per chart review, pt is currently inpatient again due to covid, pneumonia, and continued respiratory failure.     Message sent to homestar team for guidance on port flushes once pt is discharged.

## 2025-01-07 NOTE — TELEPHONE ENCOUNTER
Pts  called in this morning to let us know that she will not be able to make to appt on 1/9    Reschedule for 4/29/25 @3pm and added to waitlist     Thank you

## 2025-01-09 NOTE — TELEPHONE ENCOUNTER
Received the following message from Macarena:    Ren Gray. If her insurance is still medicare and a security 65 supplement, there is no coverage for the supplies needed to flush port in the home. A home infusion pharmacy will give a self pay price to obtain flushes and supplies. As for the nurse to flush port, ideally the agency that is currently providing service would be the one to flush port. Most likely the home infusion pharmacy would not send a nurse to flush port because this service needs to be done by a medicare certified agency in order to get paid for the visit. Since she is inpatient I would have her  talk to her discharge planner to set this up prior to discharge. They may have to switch agencies who will be able to manage ports. If you need to provide order and set it up I don't know many suppliers in [place] area. I have worked with [facility]. Phone [phone] FAX [phone].

## 2025-01-10 ENCOUNTER — TELEPHONE (OUTPATIENT)
Age: 55
End: 2025-01-10

## 2025-01-10 NOTE — TELEPHONE ENCOUNTER
"Inbound call received from Adilia at [facility], call back number [phone].    Adilia states the Patient is currently at [facility] and they are planning potential discharge Sunday. Patient will be restarting [facility] when she gets out. The [facility] Services will be Nursing, PT, and OT. [facility] would like to confirm if Dr. Santos will sign home health orders moving forward. Our Provider was signing for them previously and  the Patient's \"episode ends\" and it would be a considered a new start. [facility] states they are [facility]     Dr. Santos: Please advise, thank you!  "

## 2025-01-10 NOTE — TELEPHONE ENCOUNTER
Unfortunately, neurology would not participate in signing orders for tube feeds/nursing services.    Patient and her  is to reach primary care physician or GI team

## 2025-01-10 NOTE — TELEPHONE ENCOUNTER
Pt's  Gasper longoria. Reviewed below. Pt is still admitted to [facility]. Advised him to speak with discharge team/social work to help set up port flushes at home.    Gasper now reports pt had PEG tube placed during admission and is receiving tube feeds to help with nutrition and hydration while preventing aspiration. States pt was supposed to be discharged today however no one will sign for pt's tube feeds/ nursing services in the outpatient setting. States GI has refused. Waiting to hear back from PCP but was notified they will likely not sign. Asking if Dr. Santos would be agreeable to signing orders for tube feeds/nursing services. States the chosen company they will work with has a dietician to guide ordering process.     Did advise neurology would not typically order tube feeds, likely would not be able to assist. Asking to confirm with Dr. MILLER Please advise.

## 2025-01-10 NOTE — TELEPHONE ENCOUNTER
Gasper made aware. He reports PCP and GI will not sign orders. He confirms they are working with an inpatient . Encouraged him to continue to discuss tube feeds and port flushes with . He is agreeable.

## 2025-01-14 NOTE — TELEPHONE ENCOUNTER
One provider should sign all the form, including RN/PT/OT after recent hospitalization -  I would agree with a primary care team can guide the case and sign orders

## 2025-01-15 NOTE — TELEPHONE ENCOUNTER
Inbound [facility] [place] [facility].     Nicolasa called in and stated that pt has labs that need to be done post discharge and is asking if the provider can place an order for pt to have the labs done via [facility].     Informed Nicolasa that a message would be sent to the provider to review with the other message received from [facility] yesterday. Stated the office will contact [facility] with the provider's response.

## 2025-01-16 NOTE — TELEPHONE ENCOUNTER
Per 12/20/24 telephone encounter from Dr. MILLER  1/10/25  4:35 PM  Unfortunately, neurology would not participate in signing orders for tube feeds/nursing services.     Patient and her  is to reach primary care physician or GI team        Per note, GI and PCP will not sign the orders.

## 2025-01-16 NOTE — TELEPHONE ENCOUNTER
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     If patient was evaluated by PT/OT while in the hospital and recommendations available for my review-I will be happy to sign PT/OT visits, but in the larger scene only till patient establishes with Physiatry services ( locally or with [facility]), who can continue signing orders.

## 2025-01-16 NOTE — TELEPHONE ENCOUNTER
Reviewed notes from pt's recent inpatient stay. Pt was discharged on 1/13/25 with PEG tube. She will not have tube feeds at this point as outpatient providers did not sign off. Only receiving water via PEG tube. Does require home nursing services to help manage PEG tube along with other needs per discharge note.    Pt has 2 significant hospitalizations in the last several months with the following problems addressed (not an all inclusive list): respiratory failure, COVID, aspiration pneumonia, dysphagia, pneumothorax, anemia, and sepsis.     , to confirm- it is no longer appropriate for our office to sign for outpatient home RN/PT/OT or other orders related to this due to complexity of pt's case (that is unrelated to neurologic condition)?     [facility] advised if our office does not sign orders they will likely discharge pt from their services. It appears PCP and GI team have been unwilling to sign certain orders.

## 2025-01-17 NOTE — TELEPHONE ENCOUNTER
Called [facility] medical records dept at [phone] and spoke with Pat. She reports request should be sent via fax. Can use cover sheet with letterhead. Include pt's name, , and records being requested. Fax # [phone].     Request faxed, requested notes from PT/OT during last hospitalization.     Per chart review, pt is established with Dr. Carrington Biswas at [facility] for her Baclofen pump. Called this office, Dr. Biswas is a general neurologist.     Awaiting records to help determine if  will sign home therapy orders.

## 2025-01-30 ENCOUNTER — TELEPHONE (OUTPATIENT)
Age: 55
End: 2025-01-30

## 2025-01-30 NOTE — TELEPHONE ENCOUNTER
Scanned unsigned/incomplete [employer] Physician Certification Form into pt media

## 2025-01-30 NOTE — TELEPHONE ENCOUNTER
Received call from Poly with [employer] on behalf of patient.    Poly asked if the office received the Physicians Certification Form that was sent every month since October. Stated that form needs pt's diagnosis and the provider to sign off on it Reviewed chart and did not see any documents in media. Requested that Poly fax the form again. Provided fax number.     Contact Poly at [phone].    Kenyon: can you be on the look out for the form in [employer].

## 2025-01-31 NOTE — TELEPHONE ENCOUNTER
LMOM for Poly at [phone] that we have received the Physicians Certification Form. I mentioned that Dr MOHAMUD is willing to complete the form but isn't sure what the appropriate selections are for continuous stable care. I asked if able if she could call me and we could work on it together or fax me a previous already completed form to [phone] and I can copy it. I also asked she fax a new Physician's Certification Form to have Dr Santos's name and not Hector. Will keep an eye out for new form.

## 2025-01-31 NOTE — TELEPHONE ENCOUNTER
Poly [employer] returned call; said she faxed a new form with Dr. MOHAMUD name on it today.     She said when completing form:   Choose one box for Level of Care  Choose long term for length of care  Questions 1-6 check box for yes or no and add comment if needed    Sign form with license number.    Please call her with any further questions/concerns.

## 2025-02-03 ENCOUNTER — TELEPHONE (OUTPATIENT)
Dept: NEUROLOGY | Facility: CLINIC | Age: 55
End: 2025-02-03

## 2025-02-03 NOTE — TELEPHONE ENCOUNTER
Called pt to ask about the physician certification form. I explained it is in progress but we had some questions. They immediately suggested me to the . They had no idea about the form at all. I told them we will continue to work on it.

## 2025-02-03 NOTE — TELEPHONE ENCOUNTER
LMOM asking Poly to please CB, giving her my teams work number. I asked if she could CB to work on it together to ensure consistency of treatment for plan. I mentioned that I already called patient and she has no idea how the form as completed,  came on the phone irritated that it wasn't done and demanding me to call  Poly. I mentioned to Poly MOHAMUD is willing to fill out the form since we did in March 2023 but previous MA never rescanned completed form into pt media. I asked Poly to call me and work together as some of this form requires ICD codes that I'm not familiar with. Also gave Poly the option of faxing a completed physician certification form to direct fax [phone] as a reference.

## 2025-02-03 NOTE — TELEPHONE ENCOUNTER
1/30/2025:  Scanned unsigned/incomplete [employer] Physician Certification Form into pt media    1/31/2025:  LMOM for Poly at [phone] that we have received the Physicians Certification Form. I mentioned that Dr MOHAMUD is willing to complete the form but isn't sure what the appropriate selections are for continuous stable care. I asked if able if she could call me and we could work on it together or fax me a previous already completed form to [phone] and I can copy it. I also asked she fax a new Physician's Certification Form to have Dr Santos's name and not Hector. Will keep an eye out for new form.     2/3/2025:  Called pt to ask about the physician certification form. I explained it is in progress but we had some questions. They immediately suggested me to the . They had no idea about the form at all. I told them we will continue to work on it

## 2025-02-13 NOTE — TELEPHONE ENCOUNTER
Scanned Signed [facility] Certification and Plan of Care Form from 1/29/2025 into [facility].    Faxed to [facility].

## 2025-02-25 DIAGNOSIS — G40.909 SEIZURE DISORDER (HCC): ICD-10-CM

## 2025-02-25 DIAGNOSIS — G35 MS (MULTIPLE SCLEROSIS) (HCC): ICD-10-CM

## 2025-02-25 NOTE — TELEPHONE ENCOUNTER
Pt's  Gasper left voicemail stating new Rx is needed for brand Lamictal XR. Needs 90 day supply with 3 refills. Must have printed/signed Rx as pt gets assistance from [employer] [facility]. Rx to be sent/given to Gasper.    Rx pended, set to print. Please approve if agreeable.     Kenyon, please assist with Rx signature. Once signed, please contact pt's  as he will need the Rx. Thanks!

## 2025-02-26 RX ORDER — LAMOTRIGINE 200 MG/1
200 TABLET, FILM COATED, EXTENDED RELEASE ORAL DAILY
Qty: 90 TABLET | Refills: 3 | Status: SHIPPED | OUTPATIENT
Start: 2025-02-26

## 2025-02-27 DIAGNOSIS — G40.909 SEIZURE DISORDER (HCC): ICD-10-CM

## 2025-02-27 RX ORDER — LAMOTRIGINE 25 MG/1
50 TABLET ORAL DAILY
Qty: 180 TABLET | Refills: 1 | OUTPATIENT
Start: 2025-02-27

## 2025-02-27 NOTE — TELEPHONE ENCOUNTER
Reason for call:   [x] Refill   [] Prior Auth  [] Other:     Office:   [] PCP/Provider -   [x] Specialty/Provider - [facility] JEN Santos MD     Medication: lamoTRIgine (LaMICtal) 25 mg tablet     Dose/Frequency:  Take 2 tablets (50 mg total) by mouth daily     Quantity: 180 tablet     Pharmacy: [address] [place], [place]  [address]  [address] [place] [address]  Phone: [phone]  Fax: [phone]    Does the patient have enough for 3 days?   [x] Yes   [] No - Send as HP to POD

## 2025-02-27 NOTE — TELEPHONE ENCOUNTER
Rx approved by Dr. Brock, requires signature.     Unsigned script uploaded to chart. Please assist with provider signature. Once signed, please contact pt's  as he will need to obtain copy of Rx. Thank you!

## 2025-02-27 NOTE — TELEPHONE ENCOUNTER
Patients  called the RX Refill Line. Message is being forwarded to the office.     Patients  calling to follow up on medication for LaMICtal  MG TB24. He was informed the office is currently working on this, he verbalized understanding and was thankful for the help.     Please contact patient at [phone]

## 2025-02-27 NOTE — TELEPHONE ENCOUNTER
Received voicemail from pt's  Gasper requesting update.     Called and spoke with Gasper. Advised pt's script was approved however needs a signature.     Please scan into chart once script is signed and send to pt via [employer]. Thanks!

## 2025-03-05 RX ORDER — LAMOTRIGINE 25 MG/1
50 TABLET ORAL DAILY
Qty: 180 TABLET | Refills: 0 | Status: SHIPPED | OUTPATIENT
Start: 2025-03-05

## 2025-03-05 NOTE — TELEPHONE ENCOUNTER
Reason for call:   [x] Refill   [] Prior Auth  [x] Other: This is not a duplicate. Previous message stated a printed script is at the office for the patient. This is not for the 25mg. That is for the 200mg. The patient is asking for the 25mg to be sent to [facility] as soon as possible to ensure she does not miss a dosage.     Office:   [] PCP/Provider -   [x] Specialty/Provider -   [facility]ing Department[facility]  [facility]  Authorized By: Taylor Santos MD    Medication:  lamoTRIgine (LaMICtal) 25 mg tablet    Dose/Frequency:  Take 2 tablets (50 mg total) by mouth daily     Quantity: 180    Pharmacy: [facility] [address] [facility] [place] [facility] [address] [facility] [phone]    Does the patient have enough for 3 days?   [] Yes   [x] No - Send as HP to POD

## 2025-04-16 ENCOUNTER — TELEPHONE (OUTPATIENT)
Dept: NEUROLOGY | Facility: CLINIC | Age: 55
End: 2025-04-16

## 2025-04-16 NOTE — TELEPHONE ENCOUNTER
JOSE Neff confirming and reminding her of her appt on 4/29/25 at 3:00pm with Dr Santos at the [place] [facility]. I asked she come 15mins prior to appt to allow us time to update her chart as needed. I also asked if she is unable to make the appt and needs to reschedule or cancel to please call the office [phone].

## 2025-04-29 ENCOUNTER — OFFICE VISIT (OUTPATIENT)
Dept: NEUROLOGY | Facility: CLINIC | Age: 55
End: 2025-04-29
Payer: MEDICARE

## 2025-04-29 ENCOUNTER — TELEPHONE (OUTPATIENT)
Dept: NEUROLOGY | Facility: CLINIC | Age: 55
End: 2025-04-29

## 2025-04-29 VITALS
HEART RATE: 55 BPM | HEIGHT: 67 IN | OXYGEN SATURATION: 99 % | WEIGHT: 165 LBS | DIASTOLIC BLOOD PRESSURE: 82 MMHG | BODY MASS INDEX: 25.9 KG/M2 | SYSTOLIC BLOOD PRESSURE: 128 MMHG | TEMPERATURE: 97.1 F | RESPIRATION RATE: 16 BRPM

## 2025-04-29 DIAGNOSIS — G35 MS (MULTIPLE SCLEROSIS) (HCC): Primary | ICD-10-CM

## 2025-04-29 PROCEDURE — 99215 OFFICE O/P EST HI 40 MIN: CPT | Performed by: PSYCHIATRY & NEUROLOGY

## 2025-04-29 PROCEDURE — G2211 COMPLEX E/M VISIT ADD ON: HCPCS | Performed by: PSYCHIATRY & NEUROLOGY

## 2025-04-29 NOTE — PROGRESS NOTES
Name: Aishwarya Dhaliwal      : 1970      MRN: [id number]  Encounter Provider: Taylor Santos MD  Encounter Date: 2025   Encounter department: [facility] [place]  :  Assessment & Plan  MS (multiple sclerosis) (Formerly [facility])  Mrs. Dhaliwal has presented to [facility] [place] for follow-up on multiple sclerosis and related concerns.  Patient had multiple hospitalizations this year as well for aspiration pneumonia, sepsis due to colonization of Klebsiella, dysphagia requiring PEG placement.  Patient has been participating in regular feeding and patient still seen by speech therapy at home for additional 2 weeks.  Patient has been off Tysabri and we agreed patient would benefit from initiating Plegridy twice a month injection in addition to monthly IVIG 400 mg/kg;   On exam patient has hypophonia with very poor cough reflex.  Patient has generalized weakness with no signs of spasticity.  I discussed with the patient adjustment of baclofen pump to help improving patient's muscle tone.  We discussed that baclofen may potentially cause asthenia, was one of the recent admission for that reason.  Patient also may develop cardiac side effects of baclofen including bradycardia, chest pain, hypertension, palpitations, syncope, vasodilation.  Patient has been suggest he had to do CPR twice to his wife over the last 12 months.    Patient wheelchair has been in repair mode.  Patient has hospitalized bed in her household.    Patient need catheterization provided by her .    Patient is to continue following with [facility] neurology within 4-6 months.           History of Present Illness   HPI     Mrs. Dhaliwal has presented to [facility] [place] for follow-up after recent hospitalization.    Patient was hospitalized on  for cough and dehydration; upon discharge patient developed painful micturition; patient was hospitalized on April  "10 for asthenia and April 15 for malfunction of gastrostomy tube  Today's complaints are: Aishwarya is present with her  Antonino. He reports her being okay. They have a new family doctor whose \"playing catch up\", they don't seem too impressed.... Her  mentioned she's been in and out of the hospital (2/16/25, 4/10/25, 4/16/25). Per  she's had a few aspiration pneumonia situations, he's had to do CPR on her twice this year and she's been on a respirator 4 times.... She's very uncomfortable, she's in a loaner wheelchair because hers broke but it's not positioned for her.    Patient has been stated he had 2 instances of providing CPR but his wife over the last 12 months.  Patient also had baclofen pump which resulted in infection and recent hospitalization for asthenia.  Patient does not have any significant muscle spasm.  Patient has profound weakness in muscles in his lungs which leads the patient to weak cough and aspiration pneumonia due to dysphagia.     Patient requires baclofen pump changes at [facility] under general anesthesia.  Patient has PEG tube in place-patient's  was trained how to provide and feed his wife  Patient is using BiPAP and oxygen for central sleep apnea and obstructive sleep apnea.  Patient has been stated sometimes her oxygen drops to 80 percentile.  Patient has been off Tysabri for at least the last 4 months due to recurrent infection and progressive worsening neurological disability  Patient will be offered to have close follow-up with cardiology team considering 2D echo was done while in the hospital with ejection fraction 70%.  A-fib may minimally be primary cause of developing embolic events in brain parenchyma but still can be considered intentional in [place] of her radiographic findings.     MRA head is not available for my review, I did review 1 single note from [facility] neurology team evaluation.  Patient was not able to do CT angiogram due to " "acute kidney injury.  Elevated CPK was noted during the hospital stay patient has not had seizures since 2022.       Review of Systems   Constitutional: Negative.  Negative for chills and fever.   HENT: Negative.  Negative for ear pain and sore throat.    Eyes: Negative.  Negative for pain and visual disturbance.   Respiratory: Negative.  Negative for cough and shortness of breath.    Cardiovascular: Negative.  Negative for chest pain and palpitations.   Gastrointestinal: Negative.  Negative for abdominal pain and vomiting.   Endocrine: Negative.    Genitourinary: Negative.  Negative for dysuria and hematuria.   Musculoskeletal:  Negative for arthralgias and back pain. Gait problem: in loaner wheelchair.  Skin: Negative.  Negative for color change and rash.   Allergic/Immunologic: Negative.    Neurological:  Negative for seizures and syncope.   Hematological: Negative.    Psychiatric/Behavioral: Negative.     All other systems reviewed and are negative.   I have personally reviewed the MA's review of systems and made changes as necessary.         Objective   /82 (BP Location: Right arm, Patient Position: Sitting, Cuff Size: Adult)   Pulse 55   Temp (!) 97.1 °F (36.2 °C) (Temporal)   Resp 16   Ht 5' 7\" (1.702 m)   Wt 74.8 kg (165 lb)   SpO2 99%   BMI 25.84 kg/m²     Physical Exam  Neurological Exam  CONSTITUTIONAL: NAD, pleasant. NECK: supple, no lymphadenopathy, no thyromegaly, no JVD. CARDIOVASCULAR: RRR, normal S1S2, no murmurs, no rubs. RESP: clear to auscultation bilaterally, no wheezes/rhonchi/rales. ABDOMEN: soft, non tender, non distended. SKIN: no rash or skin lesions. EXTREMITIES: no edema, pulses 2+bilaterally. PSYCH: blunted affect  NEUROLOGIC COMPREHENSIVE EXAM: Patient is oriented to person, place and time, NAD; appropriate affect. CN II, III, IV, V, VI, VII,VIII,IX,X,XI-XII intact with EOMI, PERRLA, OKN intact, VF grossly intact, fundi poorly visualized secondary to pupillary constriction; " symmetric face noted. Motor: 3/5 RUE shoulder abduction and 2/5  with 2/5 LUE shoulder abduction, 1/5  and inability to extend her fingers; LE 0/5 bilateral symmetric; Sensory: intact to light touch and pinprick bilaterally; diminished vibration sensation feet bilaterally; Coordination not evaluated; DTR: 1/4 through, no Babinski, no clonus. Tandem -wheelchair bound.      Administrative Statements   I have spent a total time of 40 minutes in caring for this patient on the day of the visit/encounter including Diagnostic results, Prognosis, Risks and benefits of tx options, Instructions for management, Counseling / Coordination of care, Documenting in the medical record, Reviewing/placing orders in the medical record (including tests, medications, and/or procedures), Obtaining or reviewing history  , and Communicating with other healthcare professionals .

## 2025-04-29 NOTE — TELEPHONE ENCOUNTER
Patient is ready to start Plegridy after multiple admissions this year.  Please guide the patient  Antonino on the next step and how he can be trained to provide Plegridy injections.    Please submit IVIG 400 mg/kg monthly infusion again-patient's  has contacted infusion center where they would prefer to have infusions done.  Patient can start IVIG anytime they have an opening.

## 2025-04-29 NOTE — ASSESSMENT & PLAN NOTE
Mrs. Dhaliwal has presented to [facility] multiple sclerosis center for follow-up on multiple sclerosis and related concerns.  Patient had multiple hospitalizations this year as well for aspiration pneumonia, sepsis due to colonization of Klebsiella, dysphagia requiring PEG placement.  Patient has been participating in regular feeding and patient still seen by speech therapy at home for additional 2 weeks.  Patient has been off Tysabri and we agreed patient would benefit from initiating Plegridy twice a month injection in addition to monthly IVIG 400 mg/kg;   On exam patient has hypophonia with very poor cough reflex.  Patient has generalized weakness with no signs of spasticity.  I discussed with the patient adjustment of baclofen pump to help improving patient's muscle tone.  We discussed that baclofen may potentially cause asthenia, was one of the recent admission for that reason.  Patient also may develop cardiac side effects of baclofen including bradycardia, chest pain, hypertension, palpitations, syncope, vasodilation.  Patient has been suggest he had to do CPR twice to his wife over the last 12 months.    Patient wheelchair has been in repair mode.  Patient has hospitalized bed in her household.    Patient need catheterization provided by her .    Patient is to continue following with [facility] neurology within 4-6 months.

## 2025-05-02 NOTE — TELEPHONE ENCOUNTER
John    Need pt's Rx benefits. Spoke with Gasper. He states pt does not have Rx benefits, only medicare.     Called [facility]. They have coupon cards on file. Other insurance does not seem to be active.     Will need to submit Plegridy start form to see if [employer] can offer assistance.     IVIG    Gasper reports pt's previous infusion center recommended the following companies for IVIG:    [facility] home infusion   [facility] [place] (48 min away)    Home infusion is preferred. If infusion suite is needed, would like to travel less than 1 hour.     Plegridy start form and IVIG referral form for [facility] completed and emailed to MA for provider signature. Please scan into chart once signed. Thanks!

## 2025-05-06 NOTE — TELEPHONE ENCOUNTER
Plegridy start form faxed. Will f/u regarding possible free drug.    Faxed IVIG referral to [facility] at [phone]. Will f/u.

## 2025-05-06 NOTE — TELEPHONE ENCOUNTER
Good Afternoon ,       Jason from [facility] has called us back and states the order care form first page is not clear and she is needing for us to fax it back or E-mail if image can be seen more clear this way.       Fax : [phone]    Email : [email or web address]

## 2025-05-07 NOTE — TELEPHONE ENCOUNTER
Jason from [facility] called to advise that pt's insurance will not cover the services because the diagnosis code used was for MS and not the typical diagnose used for IVIG.

## 2025-05-08 DIAGNOSIS — D84.9 IMMUNODEFICIENCY (HCC): Primary | ICD-10-CM

## 2025-05-08 NOTE — TELEPHONE ENCOUNTER
Email received from Sania with [facility] questioning if infusions can be put through Part D instead. Confirmed this is acceptable. Asked if anything is needed from our office.    Called [employer] at [phone] and spoke with Kelly. They are unable to locate start form. Asking for this to be faxed again, allow 2 days to process. Faxed.

## 2025-05-08 NOTE — TELEPHONE ENCOUNTER
"Received email from Sania with [facility]:    When our pharmacy was doing the authorization for IVIG for patient M M-S,  72, the patient's insurance only has Medicare B coverage, no D coverage. Unfortunately  IVIG for MS is not covered under Medicare B. We would love to help! Can the doctor change the diagnosis code , for example, if the patient needs the IVIG for immunotherapy, etc?    Also received call from Sania phone # [phone]. She reported the same as the above.     Per CMS website, \"Currently, Medicare pays for IVIG medications for beneficiaries who have primary immune deficiency who wish to receive the drug at home.\"    , would it be appropriate to use diagnosis code for immune deficiency for pt?  "

## 2025-05-09 NOTE — TELEPHONE ENCOUNTER
I placed a new diagnosis-immune deficiency is actually ongoing concern concerning multiple hospitalization with severe sepsis/pneumonia.  Please help with IVIG preferably at home   Admission

## 2025-05-09 NOTE — TELEPHONE ENCOUNTER
Received fax from [employer] start form was received. In process.     IVIG infusion order updated to include diagnosis of immunodeficiency. Faxed to [facility].     Email also sent to Sania.

## 2025-05-09 NOTE — TELEPHONE ENCOUNTER
Last immunoglobulin panel found on file is from 2022.    - would you like updated lab to help support diagnosis for IVIG PA? Or could you addend your last office note to include information on this? Please advise. Thank you!

## 2025-05-09 NOTE — TELEPHONE ENCOUNTER
Incoming call from Fred with [facility] regarding insurance authorization. They need supporting documentation of patient's immunodeficiency, specifically IG lab values, other labs and supporting documention so he can get patient's insurance approval.     Please fax documentation to: [phone]  Attention: Fred

## 2025-05-16 NOTE — TELEPHONE ENCOUNTER
MSW phoned [facility] at [phone] to check to see if patient was scheduled yet  MSW's call was transferred to [facility] at [phone] and spoke with Sarahi - she stated that patient's chart has been reviewed and is ready to be scheduled  She will send a message to the neurology office to call patient to schedule  MSW also asked Sarahi to note that patient's previous neurologist who handled her baclofen pump is retiring at the end of this month his office will be closing, so if they want to obtain patient's old records they should do so very quickly  FABIOLA provided the phone number for Dr Shira Howard office as [phone]  MSW will follow-up next week  Results in MD note Results in MD note Results in MD note CBC/CMP Results in MD note

## 2025-05-22 ENCOUNTER — TELEPHONE (OUTPATIENT)
Dept: NEUROLOGY | Facility: CLINIC | Age: 55
End: 2025-05-22

## 2025-05-22 NOTE — TELEPHONE ENCOUNTER
Received via [employer] from [employer] Adverse Event Report to be completed.  Report scanned into media manager.

## 2025-05-22 NOTE — TELEPHONE ENCOUNTER
Received via [employer] from [employer] Adverse Event Report for patient with different reported event and additional pages to be completed.  Form scanned into Media Manager.

## 2025-06-13 ENCOUNTER — TELEPHONE (OUTPATIENT)
Age: 55
End: 2025-06-13

## 2025-06-13 NOTE — TELEPHONE ENCOUNTER
Called and informed Antonino that pt is okay to take the Plegridy on Sunday. Verbalized understanding and thankful for the call back.

## 2025-06-13 NOTE — TELEPHONE ENCOUNTER
REASON FOR CONVERSATION: UTI and Plegridy  Recd call form Gasper, pt's spouse stating that pt has a UTI and was prescribed Macrobid x7 days in which pt started today. Pt is due to take her Plegridy this Sunday. Please advise if pt is okay with administering the Plegridy on Sunday or wait until UTI /Abx on completed.     Requesting call back at [phone]

## 2025-06-13 NOTE — TELEPHONE ENCOUNTER
Patient can safely continue Plegridy irregardless of underlying upper respiratory or urinary tract infections.

## 2025-06-20 ENCOUNTER — TELEPHONE (OUTPATIENT)
Dept: NEUROLOGY | Facility: CLINIC | Age: 55
End: 2025-06-20

## 2025-06-20 DIAGNOSIS — G35 MS (MULTIPLE SCLEROSIS) (HCC): Primary | ICD-10-CM

## 2025-06-20 RX ORDER — PEGINTERFERON BETA-1A 125 UG/.5ML
94 INJECTION, SOLUTION INTRAMUSCULAR
Qty: 2 ML | Refills: 0 | Status: SHIPPED | OUTPATIENT
Start: 2025-06-20

## 2025-06-20 NOTE — TELEPHONE ENCOUNTER
Called [facility] and spoke with pharmacist Tanisha. Provided verbal Rx as noted below. Nothing further needed.

## 2025-06-20 NOTE — TELEPHONE ENCOUNTER
Order sent for a total 4 doses of Plegridy 94 mcg - not sure how many needed and why they require additional pens

## 2025-06-20 NOTE — TELEPHONE ENCOUNTER
Typically new pen is needed due to device malfunction or human error.     There is no option to send a 94mcg pen only.     Are you agreeable to verbal Rx for Plegridy 94mcg Pen x1 (or the full starter pack if they are unable to separate)?

## 2025-06-20 NOTE — TELEPHONE ENCOUNTER
Pharmacy states pt is approved for a replacement of her PLEGRITY 94 MCG PEN to [facility] pharmacy, -please review and advise unable to cue up

## 2025-07-31 ENCOUNTER — TELEPHONE (OUTPATIENT)
Dept: NEUROLOGY | Facility: CLINIC | Age: 55
End: 2025-07-31

## 2025-08-12 ENCOUNTER — TELEPHONE (OUTPATIENT)
Dept: NEUROLOGY | Facility: CLINIC | Age: 55
End: 2025-08-12